# Patient Record
Sex: FEMALE | Race: WHITE | NOT HISPANIC OR LATINO | Employment: UNEMPLOYED | URBAN - METROPOLITAN AREA
[De-identification: names, ages, dates, MRNs, and addresses within clinical notes are randomized per-mention and may not be internally consistent; named-entity substitution may affect disease eponyms.]

---

## 2017-07-21 LAB
ABO GROUP BLD: NORMAL
BLD GP AB SCN SERPL QL: NORMAL
C TRACH DNA SPEC QL NAA+PROBE: NORMAL
HBV SURFACE AG SERPL QL IA: NORMAL
HCT VFR BLD AUTO: NORMAL %
HGB BLD-MCNC: NORMAL G/DL
HIV 1 0 2 IC ZHVIC: NORMAL
N GONORRHOEA DNA SPEC QL NAA+PROBE: NORMAL
PHYSICIAN READ PAP  881411: NORMAL
PLATELET # BLD AUTO: NORMAL 10*3/UL
RH BLD: NORMAL
RPR SER QL: NON REACTIVE
RUBV IGG SERPL IA-ACNC: NORMAL

## 2017-08-02 ENCOUNTER — APPOINTMENT (OUTPATIENT)
Dept: OBGYN | Facility: CLINIC | Age: 22
End: 2017-08-02
Payer: COMMERCIAL

## 2017-08-25 ENCOUNTER — HOSPITAL ENCOUNTER (OUTPATIENT)
Facility: MEDICAL CENTER | Age: 22
End: 2017-08-25
Attending: PHYSICIAN ASSISTANT
Payer: COMMERCIAL

## 2017-08-25 ENCOUNTER — INITIAL PRENATAL (OUTPATIENT)
Dept: OBGYN | Facility: CLINIC | Age: 22
End: 2017-08-25
Payer: COMMERCIAL

## 2017-08-25 VITALS
SYSTOLIC BLOOD PRESSURE: 100 MMHG | BODY MASS INDEX: 22.02 KG/M2 | DIASTOLIC BLOOD PRESSURE: 62 MMHG | HEIGHT: 66 IN | WEIGHT: 137 LBS

## 2017-08-25 DIAGNOSIS — Z34.01 ENCOUNTER FOR SUPERVISION OF NORMAL FIRST PREGNANCY IN FIRST TRIMESTER: ICD-10-CM

## 2017-08-25 DIAGNOSIS — Z34.01 SUPERVISION OF NORMAL FIRST PREGNANCY IN FIRST TRIMESTER: ICD-10-CM

## 2017-08-25 LAB
APPEARANCE UR: NORMAL
BILIRUB UR STRIP-MCNC: NORMAL MG/DL
COLOR UR AUTO: NORMAL
GLUCOSE UR STRIP.AUTO-MCNC: NORMAL MG/DL
KETONES UR STRIP.AUTO-MCNC: NORMAL MG/DL
LEUKOCYTE ESTERASE UR QL STRIP.AUTO: NORMAL
NITRITE UR QL STRIP.AUTO: NORMAL
PH UR STRIP.AUTO: 6.5 [PH] (ref 5–8)
PROT UR QL STRIP: NORMAL MG/DL
RBC UR QL AUTO: NORMAL
SP GR UR STRIP.AUTO: 1.01
UROBILINOGEN UR STRIP-MCNC: NORMAL MG/DL

## 2017-08-25 PROCEDURE — 81002 URINALYSIS NONAUTO W/O SCOPE: CPT | Performed by: PHYSICIAN ASSISTANT

## 2017-08-25 PROCEDURE — 59401 PR NEW OB VISIT: CPT | Performed by: PHYSICIAN ASSISTANT

## 2017-08-25 NOTE — Clinical Note
Cystic Fibrosis Carrier Testing  Cristin Desai    The following information is about a blood test that can be done to determine if you and/or your partner carry the gene for cystic fibrosis.    WHAT IS CYSTIC FIBROSIS?  · Cystic fibrosis (CF) is an inherited disease that affects more than 25,000 American children and young adults.  · Symptoms of CF vary but include lung congestion, pneumonia, diarrhea and poor growth.  Most people with CF have severe medical problems and some die at a young age.  Others have so few symptoms they are unaware they have CF.  · CF does not affect intelligence.  · Although there is no cure for CF at this time, scientists are making progress in improving treatment and in searching for a cure.  In the past many people with CF  at a very young age.  Today, many are living into their 20’s and 30’s.    IS THERE A CHANCE MY BABY COULD HAVE CYSTIC FIBROSIS?  · You can have a child with CF even if there is no history in your family (see chart below).  · CF testing can help determine if you are a carrier and at risk to have a child with CF.  Note: if both parents are carriers, there is a 1 in 4 (25%) chance with each pregnancy that they will have a child with CF.  · Carriers have one normal CF gene and one altered CF gene.  · People with CF have two altered CF genes.  · Most people have two normal copies of the CF gene.    Approximate risk that a couple with no family history of cystic fibrosis will have a child with cystic fibrosis:    Ethnic background / Risk     couple:  1 in 2,500   couple:  1 in 15,000            couple:  1 in 8,000     American couple:  1 in 32,000     WHAT TESTING IS AVAILABLE?  · There is a blood test that can be done to find out if you or your partner is a carrier.  · It is important to understand that CF carrier testing does not detect all CF carriers.  · If the test shows that you are both CF carriers, you unborn baby can be  tested to find out if the baby has CF.    HOW MUCH DOES IT COST TO HAVE CYSTIC FIBROSIS CARRIER TESTING?  · Cost and insurance coverage for CF carrier testing vary depending upon the laboratory used and your insurance policy.  · The average cost for CF carrier testing is $780 per person.  · Your genetic counselor can provide you with more information about cystic fibrosis carrier testing.    _____  Yes, I am interested in discussing carrier testing with a genetic counselor.    _____  No, I am not interested in CF carrier testing or in receiving more information about CF carrier testing.      Client signature: ________________________________________  8/25/2017

## 2017-08-25 NOTE — PROGRESS NOTES
Pt. Here for NOB visit today.  # [phone]  [facility], pt brought records with her.   Pt. States having some morning sickness that comes and goes.   Pharmacy verified.

## 2017-08-25 NOTE — MR AVS SNAPSHOT
"Cristin Desai   2017 8:30 AM   Initial Prenatal   MRN: [id number]    Department[facility]   Dept Phone:  [phone]    Description:  Female : 1995   Provider:  TA Jesus; PC INTAKE           Allergies as of 2017     No Known Allergies      You were diagnosed with     Encounter for supervision of normal first pregnancy in first trimester   [979202]       Supervision of normal first pregnancy in first trimester   [[id number]]         Vital Signs     Blood Pressure Height Weight Body Mass Index Last Menstrual Period Smoking Status    100/62 mmHg 1.676 m (5' 6\") 62.143 kg (137 lb) 22.12 kg/m2 2017 Never Smoker       Basic Information     Date Of Birth Sex Race Ethnicity Preferred Language    1995 Female White Non- English      Your appointments     Sep 22, 2017  8:00 AM   OB Follow Up with TA Jesus   [facility] ([facility])    [address]  [place] [id number] [phone]            Oct 20, 2017  8:00 AM   US PREG 60 with PREG CTR US 1   [facility] ([facility])    [facility]  [address]  [place] [id number] [phone]           For [facility] Pregnancy [place] patients only: 1. Please arrive 15 min prior to your appointment time. 2. If you're late, you will be rescheduled for the next available appointment. 3. If you need to reschedule your appointment, please call us at [phone] 48 hours prior to your appointment. 4. Do not bring children as they will not be allowed in exam room. 5. Only one family member may be present in room during exam. 6. The exam will be 30-60 minutes depending on the exam ordered by the physician. 7. The sonographer is not allowed to discuss findings during the exam. Your provider will go over the results with you at your next appointment. 8. The purpose of this ultrasound is to determine if baby is healthy. Diagnostic ultrasounds are NOT to " determine the gender of the baby. 9. NO photography or video recording is allowed in exam room. 10. NO cell phones allowed in the exam room. INFORMACION SOBRE ROBLEDO ULTRASONIDO 1. Por favor de llegar 15 minutos antes de robledo capo. 2. Si llega tarde, le tenemos que cambiar la capo para otra fecha. 3. Si necesita cambiar robledo capo, por favor llame 48 horas antes de la capo. [phone] 4. Por favor no traer niños. No se permiten en cuarto de Ultrasonido. 5. Solamente se permite bernice persona en el cuarto rosina el examen. 6. El examen dura 30-60 minutos, dependiendo del examen ordenado por el Doctor. 7. El Sonógrafo no está autorizado hablar sobre robledo examen. Robledo doctor o partera le va explicar los resultados en robledo próxima capo. 8. El propósito del Ultrasonido es para determinar si robledo randy viene saludable. No es para determinar el sexo de robledo randy. 9. Por favor no fotos o cámaras de grabar. 10. No celulares permitidos en el cuarto de examen.              Problem List              ICD-10-CM Priority Class Noted - Resolved    Supervision of normal first pregnancy in first trimester Z34.01   8/25/2017 - Present      Health Maintenance        Date Due Completion Dates    IMM HEP B VACCINE (1 of 3 - Primary Series) 1995 ---    IMM HEP A VACCINE (1 of 2 - Standard Series) 1/1/1997 ---    IMM HPV VACCINE (1 of 3 - Female 3 Dose Series) 1/1/2007 ---    IMM VARICELLA (CHICKENPOX) VACCINE (1 of 2 - 2 Dose Adolescent Series) 1/1/2009 ---    IMM MENINGOCOCCAL VACCINE (MCV4) (1 of 1) 1/1/2012 ---    IMM DTaP/Tdap/Td Vaccine (1 - Tdap) 1/1/2015 ---    PAP SMEAR 1/1/2017 ---    IMM INFLUENZA (1) 9/1/2017 ---            Results     POCT Urinalysis      Component Value Standard Range & Units    POC Color  Negative    POC Appearance  Negative    POC Leukocyte Esterase Trace Negative    POC Nitrites Neg Negative    POC Urobiligen  Negative (0.2) mg/dL    POC Protein Trace Negative mg/dL    POC Urine PH 6.5 5.0 - 8.0    POC Blood Neg Negative      POC Specific Gravity 1.015 <1.005 - >1.030    POC Ketones Neg Negative mg/dL    POC Biliruben  Negative mg/dL    POC Glucose Neg Negative mg/dL                        Current Immunizations     No immunizations on file.      Below and/or attached are the medications your provider expects you to take. Review all of your home medications and newly ordered medications with your provider and/or pharmacist. Follow medication instructions as directed by your provider and/or pharmacist. Please keep your medication list with you and share with your provider. Update the information when medications are discontinued, doses are changed, or new medications (including over-the-counter products) are added; and carry medication information at all times in the event of emergency situations     Allergies:  No Known Allergies          Medications  Valid as of: August 25, 2017 -  2:10 PM    Generic Name Brand Name Tablet Size Instructions for use    Drospirenone-Ethinyl Estradiol   Take  by mouth.        Drospirenone-Ethinyl Estradiol (Tab) BURT 3-0.02 MG Take 1 Tab by mouth every day.        Ibuprofen (Tab) MOTRIN 200 MG Take 400 mg by mouth every 6 hours as needed.        Naproxen (Tab) NAPROSYN 500 MG Take 1 Tab by mouth 2 times a day, with meals.        NON SPECIFIED   Tincture methiolate        NON SPECIFIED   sulfatiazol        Prenatal MV-Min-Fe Fum-FA-DHA   Take  by mouth.        .                 Medicines prescribed today were sent to:     [facility][id number] - [place], [place] - [address] [place] [address] AT [address] [address]    Phone: [phone] Fax: [phone]    Open 24 Hours?: No      Medication refill instructions:       If your prescription bottle indicates you have medication refills left, it is not necessary to call your provider’s office. Please contact your pharmacy and they will refill your medication.    If your prescription bottle indicates you do not have any refills left,  you may request refills at any time through one of the following ways: The online [employer] system (except Urgent Care), by calling your provider’s office, or by asking your pharmacy to contact your provider’s office with a refill request. Medication refills are processed only during regular business hours and may not be available until the next business day. Your provider may request additional information or to have a follow-up visit with you prior to refilling your medication.   *Please Note: Medication refills are assigned a new Rx number when refilled electronically. Your pharmacy may indicate that no refills were authorized even though a new prescription for the same medication is available at the pharmacy. Please request the medicine by name with the pharmacy before contacting your provider for a refill.        Your To Do List     Future Labs/Procedures Complete By Expires    CHLAMYDIA/GC PCR URINE OR SWAB  As directed 8/26/2018    US-OB 2ND 3RD TRI COMPLETE  As directed 2/25/2018      Other Notes About Your Plan     FOB: Leonard [place] Status: Patient Declined
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
## 2017-08-26 LAB
C TRACH DNA SPEC QL NAA+PROBE: NEGATIVE
N GONORRHOEA DNA SPEC QL NAA+PROBE: NEGATIVE
SPECIMEN SOURCE: NORMAL

## 2017-09-22 ENCOUNTER — ROUTINE PRENATAL (OUTPATIENT)
Dept: OBGYN | Facility: CLINIC | Age: 22
End: 2017-09-22
Payer: COMMERCIAL

## 2017-09-22 VITALS — SYSTOLIC BLOOD PRESSURE: 108 MMHG | DIASTOLIC BLOOD PRESSURE: 62 MMHG | WEIGHT: 140 LBS | BODY MASS INDEX: 22.6 KG/M2

## 2017-09-22 DIAGNOSIS — Z67.91 RH NEGATIVE STATUS DURING PREGNANCY IN SECOND TRIMESTER: ICD-10-CM

## 2017-09-22 DIAGNOSIS — O26.892 RH NEGATIVE STATUS DURING PREGNANCY IN SECOND TRIMESTER: ICD-10-CM

## 2017-09-22 DIAGNOSIS — Z34.01 SUPERVISION OF NORMAL FIRST PREGNANCY IN FIRST TRIMESTER: ICD-10-CM

## 2017-09-22 PROCEDURE — 90040 PR PRENATAL FOLLOW UP: CPT | Performed by: PHYSICIAN ASSISTANT

## 2017-09-22 NOTE — PROGRESS NOTES
Pt has no complaints with cramping, bleeding or pain. No FM yet. PNL, GC/CT wnl - pt notified of results. Declines AFP today, but will do if US abnormal. US on 10/20. Declines flu vaccine. RTC 4 wk or sooner prn.

## 2017-09-22 NOTE — PROGRESS NOTES
Ob f/u.   No VB, LOF or contractions   C/O morning sickness only  Phone number # [phone]  Pharmacy verified with patient  WT= 140 lbs             KR=842/62  Flu shot offered, pt declines   US schedule on 10/20/2017 8:00 am

## 2017-10-19 ENCOUNTER — ROUTINE PRENATAL (OUTPATIENT)
Dept: OBGYN | Facility: CLINIC | Age: 22
End: 2017-10-19
Payer: COMMERCIAL

## 2017-10-19 VITALS — WEIGHT: 143 LBS | SYSTOLIC BLOOD PRESSURE: 102 MMHG | DIASTOLIC BLOOD PRESSURE: 60 MMHG | BODY MASS INDEX: 23.08 KG/M2

## 2017-10-19 DIAGNOSIS — Z34.01 SUPERVISION OF NORMAL FIRST PREGNANCY IN FIRST TRIMESTER: Primary | ICD-10-CM

## 2017-10-19 DIAGNOSIS — Z67.91 RH NEGATIVE STATUS DURING PREGNANCY IN SECOND TRIMESTER: ICD-10-CM

## 2017-10-19 DIAGNOSIS — O26.892 RH NEGATIVE STATUS DURING PREGNANCY IN SECOND TRIMESTER: ICD-10-CM

## 2017-10-19 PROCEDURE — 90040 PR PRENATAL FOLLOW UP: CPT | Performed by: PHYSICIAN ASSISTANT

## 2017-10-19 PROCEDURE — 90471 IMMUNIZATION ADMIN: CPT | Performed by: PHYSICIAN ASSISTANT

## 2017-10-19 PROCEDURE — 90686 IIV4 VACC NO PRSV 0.5 ML IM: CPT | Performed by: PHYSICIAN ASSISTANT

## 2017-10-19 ASSESSMENT — PATIENT HEALTH QUESTIONNAIRE - PHQ9: CLINICAL INTERPRETATION OF PHQ2 SCORE: 0

## 2017-10-19 NOTE — PROGRESS NOTES
Ob f/u. + fetal movement   No VB, LOF or contractions   C/O   Cramping  Phone number # [phone]  Pharmacy verified with patient  WT= 143 lbs             RC=358/60  Pt declines AFP  Pt declines flu shot   US scheduled on 10/20/2017

## 2017-10-19 NOTE — PROGRESS NOTES
Pt has no complaints with cramping, bleeding or pain, though pt has had pain in L side under ribs where she thinks she pulled a muscle. Pt to try warm and cool packs prn, Tylenol prn, call if worsening. +FM.  US to be done tomorrow. Declines AFP. Flu vaccine given today. RTC 4 wk or sooner prn.

## 2017-10-20 ENCOUNTER — APPOINTMENT (OUTPATIENT)
Dept: RADIOLOGY | Facility: IMAGING CENTER | Age: 22
End: 2017-10-20
Attending: PHYSICIAN ASSISTANT
Payer: COMMERCIAL

## 2017-10-20 DIAGNOSIS — Z34.01 ENCOUNTER FOR SUPERVISION OF NORMAL FIRST PREGNANCY IN FIRST TRIMESTER: ICD-10-CM

## 2017-10-20 PROCEDURE — 76805 OB US >/= 14 WKS SNGL FETUS: CPT | Performed by: PHYSICIAN ASSISTANT

## 2017-10-23 ENCOUNTER — TELEPHONE (OUTPATIENT)
Dept: OBGYN | Facility: CLINIC | Age: 22
End: 2017-10-23

## 2017-10-23 NOTE — TELEPHONE ENCOUNTER
Pt called the triage line requesting U/S results. I called Pt back and informed her that her U/S was within normal limits and that her due date will stay the same. I informed Pt that the provider will review her U/S with more detail at her next OB appt. Pt verbalized understanding and had no further questions.

## 2017-11-08 ENCOUNTER — TELEPHONE (OUTPATIENT)
Dept: OBGYN | Facility: CLINIC | Age: 22
End: 2017-11-08

## 2017-11-09 NOTE — TELEPHONE ENCOUNTER
Returned pt's phone call. Spoke with pt. Pt stated for the past 3 days she has been feeling a numbing / tingling sensation under her right breast on her ribs when she leans forward that lasts for couple minutes and it goes away when she leans back. Denies pain, redness, or warm to the touch. Reports +FM. After consulting with  Anushka Peñaloza C.N.M. Informed pt that the sensations she is feeling it maybe due to her breasts are growing and it may be pinching a nerve. Instructed pt to keep an eye on her symptoms if worsens then to call us back to come for an evaluation. Pt verbalized understanding and will comply with instructions. Pt had no further questions or concerns.

## 2017-11-14 ENCOUNTER — HOSPITAL ENCOUNTER (OUTPATIENT)
Facility: MEDICAL CENTER | Age: 22
End: 2017-11-14
Attending: OBSTETRICS & GYNECOLOGY | Admitting: OBSTETRICS & GYNECOLOGY
Payer: COMMERCIAL

## 2017-11-14 VITALS
RESPIRATION RATE: 16 BRPM | HEART RATE: 81 BPM | DIASTOLIC BLOOD PRESSURE: 75 MMHG | SYSTOLIC BLOOD PRESSURE: 121 MMHG | TEMPERATURE: 98.1 F

## 2017-11-14 LAB — CRYSTALS AMN MICRO: NORMAL

## 2017-11-14 PROCEDURE — 89060 EXAM SYNOVIAL FLUID CRYSTALS: CPT

## 2017-11-14 NOTE — PROGRESS NOTES
0840-pt presents from home with c/o wet underwear and feeling like she is leaking fluid, no c/o bleeding, pain or uc's, states baby is moving normally, FHR dopplered at 150's, TOCO applied, vs taken  0850-report given to Dr Lema, will discuss with Dr Tello which test to use,  0900-TC Dr Lema, della slide ordered  0905-SSE performed, no pooling noted, white discharge noted, fern slide prepared and sent  1020-ferstephany back negative, TC Dr Lema  1025-discharge orders received  1030-pt discharged home with understanding PTL, verbalized understanding, left ambulatory with SO

## 2017-11-14 NOTE — PROGRESS NOTES
[facility] LABOR AND DELIVERY TRIAGE PROGRESS NOTE    PATIENT ID:  NAME:  Cristin Desai  MRN:               [id number]  YOB: 1995     22 y.o. female  at 23w6d.    Subjective: Pt comes in complaining leakage of fluid. Pt states her underwear have been wet for the past 2 days. Pt admits to sexual intercourse 3 days ago. Pt denies any spotting or cramping.       negative  For CTXS.   negative Feels pain   unknown for LOF  negative for vaginal bleeding.   positive for fetal movement    ROS: Patient denies any fever chills, nausea, vomiting, headache, chest pain, shortness of breath, or dysuria or unusual swelling of hands or feet.     Objective:    There were no vitals filed for this visit.  No data recorded.    General: No acute distress, resting comfortably in bed.  HEENT: normocephalic, nontraumatic, PERRLA, EOMI  Cardiovascular: Heart RRR with no murmurs, rubs or gallops. Distal Pulses 2+  Respiratory: symmetric chest expansion, lungs CTAB, with no wheezes, rales, rhonci  Abdomen: gravid, nontender  Musculoskeletal: strength 5/5 in four extremities  Neuro: non focal with no numbness, tingling or changes in sensation    Cervix: Closed thick and high  [place]: No Uterine Contractions   Doppler:  bpm     Assessment: 22 y.o. female  at 23w6d.    Plan:     1. Fern Negative    2. Discharge home with return precautions and instructions to if increased intensity or frequency of contractions, decreased fetal movement, vaginal bleeding or leakage of fluid.       Discussed case with Dr. Mclean, TPN Attending. Case was discussed and attending agreed with plan prior to discharge of patient.

## 2017-11-22 ENCOUNTER — ROUTINE PRENATAL (OUTPATIENT)
Dept: OBGYN | Facility: CLINIC | Age: 22
End: 2017-11-22
Payer: COMMERCIAL

## 2017-11-22 VITALS — SYSTOLIC BLOOD PRESSURE: 118 MMHG | DIASTOLIC BLOOD PRESSURE: 70 MMHG | BODY MASS INDEX: 24.69 KG/M2 | WEIGHT: 153 LBS

## 2017-11-22 DIAGNOSIS — Z34.01 SUPERVISION OF NORMAL FIRST PREGNANCY IN FIRST TRIMESTER: Primary | ICD-10-CM

## 2017-11-22 DIAGNOSIS — Z67.91 RH NEGATIVE STATUS DURING PREGNANCY IN SECOND TRIMESTER: ICD-10-CM

## 2017-11-22 DIAGNOSIS — O26.892 RH NEGATIVE STATUS DURING PREGNANCY IN SECOND TRIMESTER: ICD-10-CM

## 2017-11-22 PROCEDURE — 90040 PR PRENATAL FOLLOW UP: CPT | Performed by: PHYSICIAN ASSISTANT

## 2017-11-22 NOTE — PROGRESS NOTES
Pt here for OB F/V. Good fetal movement. Denies LOF, VB.  US done 10/20/17- wants to review results  Phone number # [phone]  Pharmacy verified  Declined flu shot  28 week labs pended

## 2017-11-22 NOTE — PROGRESS NOTES
Pt has no complaints with cramping, bleeding or pain. +FM. 1hr GTT, H/H, RPR slip given today with instructions. US results wnl - pt notified. Pt gained 10 lb since last visit, diet reviewed and pt to cut back on sweets. Flu vaccines given last visit. Rhogam next visit, and TDaP given today. RTC 4 wk or sooner prn.

## 2017-12-04 ENCOUNTER — HOSPITAL ENCOUNTER (OUTPATIENT)
Dept: LAB | Facility: MEDICAL CENTER | Age: 22
End: 2017-12-04
Attending: PHYSICIAN ASSISTANT
Payer: COMMERCIAL

## 2017-12-04 DIAGNOSIS — Z34.01 SUPERVISION OF NORMAL FIRST PREGNANCY IN FIRST TRIMESTER: ICD-10-CM

## 2017-12-04 LAB
GLUCOSE 1H P 50 G GLC PO SERPL-MCNC: 90 MG/DL (ref 70–139)
HCT VFR BLD AUTO: 35 % (ref 37–47)
HGB BLD-MCNC: 11.1 G/DL (ref 12–16)
TREPONEMA PALLIDUM IGG+IGM AB [PRESENCE] IN SERUM OR PLASMA BY IMMUNOASSAY: NON REACTIVE

## 2017-12-04 PROCEDURE — 85014 HEMATOCRIT: CPT

## 2017-12-04 PROCEDURE — 85018 HEMOGLOBIN: CPT

## 2017-12-04 PROCEDURE — 82950 GLUCOSE TEST: CPT

## 2017-12-04 PROCEDURE — 86780 TREPONEMA PALLIDUM: CPT

## 2017-12-04 PROCEDURE — 36415 COLL VENOUS BLD VENIPUNCTURE: CPT

## 2017-12-07 ENCOUNTER — TELEPHONE (OUTPATIENT)
Dept: OBGYN | Facility: CLINIC | Age: 22
End: 2017-12-07

## 2017-12-07 NOTE — TELEPHONE ENCOUNTER
Pt called and asked when she can get the RHOGAM injections since she is O NEGATIVE blood type. And is concerned about having the RHOGAM  In her next visit because she will be 30 weeks     Jennifer LARIOS  was informed and recommend to scheduled a nurse visit to have the RHOGAM . And states that is not going to affect the baby if pt do not get the RHOGAM at 27 since pt was concerned about it.    Pt was informed and verbalized understanding   And states she scheduled an appt for the 13th

## 2017-12-13 ENCOUNTER — NON-PROVIDER VISIT (OUTPATIENT)
Dept: OBGYN | Facility: CLINIC | Age: 22
End: 2017-12-13
Payer: COMMERCIAL

## 2017-12-13 DIAGNOSIS — Z34.01 SUPERVISION OF NORMAL FIRST PREGNANCY IN FIRST TRIMESTER: ICD-10-CM

## 2017-12-13 DIAGNOSIS — Z67.91 RH NEGATIVE STATUS DURING PREGNANCY IN SECOND TRIMESTER: ICD-10-CM

## 2017-12-13 DIAGNOSIS — O36.0990 RHESUS ISOIMMUNIZATION DURING PREGNANCY, ANTEPARTUM, SINGLE OR UNSPECIFIED FETUS: ICD-10-CM

## 2017-12-13 DIAGNOSIS — O26.892 RH NEGATIVE STATUS DURING PREGNANCY IN SECOND TRIMESTER: ICD-10-CM

## 2017-12-13 PROCEDURE — 96372 THER/PROPH/DIAG INJ SC/IM: CPT | Performed by: NURSE PRACTITIONER

## 2017-12-19 ENCOUNTER — TELEPHONE (OUTPATIENT)
Dept: OBGYN | Facility: CLINIC | Age: 22
End: 2017-12-19

## 2017-12-19 NOTE — TELEPHONE ENCOUNTER
Returned pt's phone call. Spoke with pt. Pt stated she has a really bad stuffy nose and congestion for the past 2 days. Stated she is concerned that every time she blows up her nose she gets a bloody nose. Pt stated she is currently taking mucinex as it was advised when she called this morning but is not helping. Per consult with Adilene Calloway C.N.M. Pt was informed that she is getting a bloody nose when she blows up her nose due to her nasal is dry and needs to use Ocean Nasal Spray to moisten, also Adilene recommended to open up her shower and let the hot water run and breath in the steam wich will help open up her nasal congestion. Pt verbalized understanding and will comply with instructions.  Pt had no further questions or concerns.

## 2017-12-19 NOTE — TELEPHONE ENCOUNTER
Pt called the triage line asking what she could take for her cold symptoms. I called Pt back she is c/o having a stuffy nose, Pt denies any fevers, body aches, N&V or diarrhea. I read the cold and cough remedies hand out to patient and informed her that if she develops and fevers, N&V, diarrhea or her symptoms get worse she needs to be seen at the hospital. Pt verbalized understanding and had no further questions.

## 2017-12-28 ENCOUNTER — ROUTINE PRENATAL (OUTPATIENT)
Dept: OBGYN | Facility: CLINIC | Age: 22
End: 2017-12-28
Payer: COMMERCIAL

## 2017-12-28 VITALS — DIASTOLIC BLOOD PRESSURE: 64 MMHG | BODY MASS INDEX: 26.31 KG/M2 | WEIGHT: 163 LBS | SYSTOLIC BLOOD PRESSURE: 102 MMHG

## 2017-12-28 DIAGNOSIS — Z34.01 SUPERVISION OF NORMAL FIRST PREGNANCY IN FIRST TRIMESTER: Primary | ICD-10-CM

## 2017-12-28 DIAGNOSIS — O26.892 RH NEGATIVE STATUS DURING PREGNANCY IN SECOND TRIMESTER: ICD-10-CM

## 2017-12-28 DIAGNOSIS — Z67.91 RH NEGATIVE STATUS DURING PREGNANCY IN SECOND TRIMESTER: ICD-10-CM

## 2017-12-28 PROCEDURE — 90715 TDAP VACCINE 7 YRS/> IM: CPT | Performed by: PHYSICIAN ASSISTANT

## 2017-12-28 PROCEDURE — 90040 PR PRENATAL FOLLOW UP: CPT | Performed by: PHYSICIAN ASSISTANT

## 2017-12-28 PROCEDURE — 90471 IMMUNIZATION ADMIN: CPT | Performed by: PHYSICIAN ASSISTANT

## 2017-12-28 NOTE — PROGRESS NOTES
Ob f/u. + fetal movement good  No VB, LOF or contractions   C/O no complaints today   Phone number #  [phone]  Pharmacy verified with patient  XF=470 lbs              EC=284/64  ELIZABETH given with instructions   Tdap vaccine given. 12/28/2017 Right  Deltoid. VIS given and screening check list reviewed with pt.

## 2017-12-28 NOTE — LETTER
"Count Your Baby's Movements  Another step to a healthy delivery    A Epic Dress Re Test             Dept: [phone]    How Many Weeks Pregnant? 30w1d    Date to Begin Counting: today              How to use this chart    One way for your physician to keep track of your baby's health is by knowing how often the baby moves (or \"kicks\") in your womb.  You can help your physician to do this by using this chart every day.    Every day, you should see how many hours it takes for your baby to move 10 times.  Start in the morning, as soon as you get up.    · First, write down the time your baby moves until you get to 10.  · Check off one box every time your baby moves until you get to 10.  · Write down the time you finished counting in the last column.  · Total how long it took to count up all 10 movements.  · Finally, fill in the box that shows how long this took.  After counting 10 movements, you no longer have to count any more that day.  The next morning, just start counting again as soon as you get up.    What should you call a \"movement\"?  It is hard to say, because it will feel different from one mother to another and from one pregnancy to the next.  The important thing is that you count the movements the same way throughout your pregnancy.  If you have more questions, you should ask your physician.    Count carefully every day!  SAMPLE:  Week 28    How many hours did it take to feel 10 movements?       Start  Time     1     2     3     4     5     6     7     8     9     10   Finish Time   Mon 8:20 ·  ·  ·  ·  ·  ·  ·  ·  ·  ·  11:40   Tue Wed Thu Fri               Sat               Sun                 IMPORTANT: You should contact your physician if it takes more than two hours for you to feel 10 movements.  Each morning, write down the time and start to count the movements of your baby.  Keep track by checking off one box every time you feel one movement.  When you have " "felt 10 \"kicks\", write down the time you finished counting in the last column.  Then fill in the   box (over the check vernon) for the number of hours it took.  Be sure to read the complete instructions on the previous page.            "

## 2017-12-28 NOTE — PROGRESS NOTES
Pt has no complaints with cramping, UCs, VB, LOF, though pt has had incr pressure. +FM - FKC sheet given today. Rhogam given at 28wk. 1hr GTT, H/H, RPR wnl - pt notified of results. PTL precautions reviewed. Tour info given. Pt also discussed that she doesn't want to BF and how to prevent issues - strongly encouraged pt to think about BF. TDaP given today. Flu vaccine given already. RTC 2 wk or sooner prn.

## 2018-01-05 ENCOUNTER — TELEPHONE (OUTPATIENT)
Dept: OBGYN | Facility: CLINIC | Age: 23
End: 2018-01-05

## 2018-01-05 NOTE — TELEPHONE ENCOUNTER
Pt called the triage line c/o having sharp lower abdominal pain. I called Pt back she stated that she had lower abdominal pain yesterday, but that they got better after she took a warm shower went to bed. Pt denies any complications this morning and is reporting good fetal movement. I informed Pt that if her pain returns and happens every five mins for an hour, has heavy vaginal bleeding , vaginal leaking or if she has decreased fetal movement she needs to go to L&D. Pt verbalized understanding and had no further questions..

## 2018-01-09 ENCOUNTER — ROUTINE PRENATAL (OUTPATIENT)
Dept: OBGYN | Facility: CLINIC | Age: 23
End: 2018-01-09
Payer: COMMERCIAL

## 2018-01-09 VITALS — BODY MASS INDEX: 26.63 KG/M2 | DIASTOLIC BLOOD PRESSURE: 64 MMHG | SYSTOLIC BLOOD PRESSURE: 100 MMHG | WEIGHT: 165 LBS

## 2018-01-09 DIAGNOSIS — Z34.01 SUPERVISION OF NORMAL FIRST PREGNANCY IN FIRST TRIMESTER: ICD-10-CM

## 2018-01-09 DIAGNOSIS — Z67.91 RH NEGATIVE STATUS DURING PREGNANCY IN SECOND TRIMESTER: ICD-10-CM

## 2018-01-09 DIAGNOSIS — O26.892 RH NEGATIVE STATUS DURING PREGNANCY IN SECOND TRIMESTER: ICD-10-CM

## 2018-01-09 PROCEDURE — 90040 PR PRENATAL FOLLOW UP: CPT | Performed by: PHYSICIAN ASSISTANT

## 2018-01-09 ASSESSMENT — PATIENT HEALTH QUESTIONNAIRE - PHQ9: CLINICAL INTERPRETATION OF PHQ2 SCORE: 0

## 2018-01-09 NOTE — PROGRESS NOTES
Pt has no complaints with cramping, UCs, VB, LOF, though pt called last week after having lower abd pains that resolved with rest and water. PTL precautions reviewed. +FM. Tour/class info given today. RTC 2 wk or sooner prn.

## 2018-01-09 NOTE — PROGRESS NOTES
Ob f/u. + fetal movement good  No VB, LOF or contractions   C/O  Friday pt called states was having sharp pain in the pelvic are. She was told that maybe was the jocelyn pleitez. Pt states feeling good today No complaints   Phone number # [phone]  Pharmacy verified with patient  WT= 165 lbs             JS=441/64

## 2018-01-24 ENCOUNTER — ROUTINE PRENATAL (OUTPATIENT)
Dept: OBGYN | Facility: CLINIC | Age: 23
End: 2018-01-24
Payer: COMMERCIAL

## 2018-01-24 VITALS — DIASTOLIC BLOOD PRESSURE: 62 MMHG | SYSTOLIC BLOOD PRESSURE: 100 MMHG | WEIGHT: 173 LBS | BODY MASS INDEX: 27.92 KG/M2

## 2018-01-24 DIAGNOSIS — Z67.91 RH NEGATIVE STATUS DURING PREGNANCY IN SECOND TRIMESTER: ICD-10-CM

## 2018-01-24 DIAGNOSIS — Z34.01 SUPERVISION OF NORMAL FIRST PREGNANCY IN FIRST TRIMESTER: ICD-10-CM

## 2018-01-24 DIAGNOSIS — O26.892 RH NEGATIVE STATUS DURING PREGNANCY IN SECOND TRIMESTER: ICD-10-CM

## 2018-01-24 PROCEDURE — 90040 PR PRENATAL FOLLOW UP: CPT | Performed by: PHYSICIAN ASSISTANT

## 2018-01-24 NOTE — PROGRESS NOTES
Pt has no complaints with regular or strong UCs, Vb, LOF, though pt has occ tightening of abd only. +FM. GBS next visit. PTL precautions reviewed. Daily FKC recommended. Vtx confirmed by US today. RTC 2 wk or sooner prn.

## 2018-01-24 NOTE — PROGRESS NOTES
Pt. Here for OB/FU today. Reports Good FM.   Good # [phone]  Pt states having contractions that come and go.   Pharmacy verified.

## 2018-01-30 ENCOUNTER — TELEPHONE (OUTPATIENT)
Dept: OBGYN | Facility: CLINIC | Age: 23
End: 2018-01-30

## 2018-01-30 ENCOUNTER — HOSPITAL ENCOUNTER (OUTPATIENT)
Facility: MEDICAL CENTER | Age: 23
End: 2018-01-30
Attending: OBSTETRICS & GYNECOLOGY | Admitting: OBSTETRICS & GYNECOLOGY
Payer: COMMERCIAL

## 2018-01-30 VITALS
HEART RATE: 86 BPM | RESPIRATION RATE: 16 BRPM | DIASTOLIC BLOOD PRESSURE: 66 MMHG | WEIGHT: 173 LBS | TEMPERATURE: 97.5 F | HEIGHT: 66 IN | SYSTOLIC BLOOD PRESSURE: 113 MMHG | BODY MASS INDEX: 27.8 KG/M2

## 2018-01-30 LAB
APPEARANCE UR: CLEAR
COLOR UR AUTO: YELLOW
GLUCOSE UR QL STRIP.AUTO: NEGATIVE MG/DL
KETONES UR QL STRIP.AUTO: NEGATIVE MG/DL
LEUKOCYTE ESTERASE UR QL STRIP.AUTO: NEGATIVE
NITRITE UR QL STRIP.AUTO: NEGATIVE
PH UR STRIP.AUTO: 7 [PH]
PROT UR QL STRIP: NEGATIVE MG/DL
RBC UR QL AUTO: NEGATIVE
SP GR UR: 1.01

## 2018-01-30 PROCEDURE — 81002 URINALYSIS NONAUTO W/O SCOPE: CPT

## 2018-01-30 PROCEDURE — 59025 FETAL NON-STRESS TEST: CPT | Performed by: OBSTETRICS & GYNECOLOGY

## 2018-01-30 ASSESSMENT — PAIN SCALES - GENERAL: PAINLEVEL_OUTOF10: 0

## 2018-01-30 NOTE — TELEPHONE ENCOUNTER
Returning patient's call  Patient c/o hands and feet swollen and painful, having pressure headaches. Tylenol did not help. spots in front of her eyes.. Denies any lower abdominal pain, no vaginal bleeding. Baby moving okay. Drinking water.  States pain under her ribs but thinks is baby kicking. Consulted with Adilene Calloway CNM, she advised patient to be evaluated at L&D. Patient notified and agrees.

## 2018-01-30 NOTE — PROGRESS NOTES
EDC 3--18 34.6 weeks pt is here with c/O swelling in her hands and feet, H/A  And seeing spots. External monitors on serial BP are being taken, UA POC done with absent for protein. BP are all normal. DTRs are +2 and no colonus. Denies epigastric pain   And feels fetal movements. Reactive strip, Dr Santamaria was called report given and order received to discharge pt home.  1115 Dr Santamaria was asked to review heart tones due to some variables.  1120 Dr Santamaria reviewed the strip and called back,he is  Ok with the variables.  1130 Dr Santamaria was called again re strip and order received to BPP.  1220 Dr Santamaria called and BPP was discontinued due to reactive strip and order received to discharge pt home.  1229 pt was given instructions and was reassured, precautions given and pt was discharged home with FOB at 1232.

## 2018-02-09 ENCOUNTER — HOSPITAL ENCOUNTER (OUTPATIENT)
Facility: MEDICAL CENTER | Age: 23
End: 2018-02-09
Attending: PHYSICIAN ASSISTANT
Payer: COMMERCIAL

## 2018-02-09 ENCOUNTER — ROUTINE PRENATAL (OUTPATIENT)
Dept: OBGYN | Facility: CLINIC | Age: 23
End: 2018-02-09
Payer: COMMERCIAL

## 2018-02-09 VITALS — DIASTOLIC BLOOD PRESSURE: 72 MMHG | SYSTOLIC BLOOD PRESSURE: 122 MMHG | WEIGHT: 179 LBS | BODY MASS INDEX: 28.89 KG/M2

## 2018-02-09 DIAGNOSIS — Z67.91 RH NEGATIVE STATUS DURING PREGNANCY IN SECOND TRIMESTER: ICD-10-CM

## 2018-02-09 DIAGNOSIS — O26.892 RH NEGATIVE STATUS DURING PREGNANCY IN SECOND TRIMESTER: ICD-10-CM

## 2018-02-09 DIAGNOSIS — Z34.01 SUPERVISION OF NORMAL FIRST PREGNANCY IN FIRST TRIMESTER: Primary | ICD-10-CM

## 2018-02-09 DIAGNOSIS — Z34.01 SUPERVISION OF NORMAL FIRST PREGNANCY IN FIRST TRIMESTER: ICD-10-CM

## 2018-02-09 PROCEDURE — 90040 PR PRENATAL FOLLOW UP: CPT | Performed by: PHYSICIAN ASSISTANT

## 2018-02-09 PROCEDURE — 87653 STREP B DNA AMP PROBE: CPT

## 2018-02-09 NOTE — PROGRESS NOTES
Pt. here for Ob f/u and GBS today. Good # [phone]  Good FM  Pt states having some contractions that come and go.   Pharmacy verified.

## 2018-02-09 NOTE — PROGRESS NOTES
Pt has no complaints with cramping, UCs, Vb, LOF, though pt has had a lot of pressure over past week. Pt was seen in L&D for swelling of hands and feet, though pt notes she was travelling to  the day before. Pt has not had swelling since. +FM. GBS done today. Labor precautions reviewed in detail. Daily FKC and walks recommended. RTC 1 wk or sooner prn.

## 2018-02-11 LAB — GP B STREP DNA SPEC QL NAA+PROBE: NEGATIVE

## 2018-02-16 ENCOUNTER — ROUTINE PRENATAL (OUTPATIENT)
Dept: OBGYN | Facility: CLINIC | Age: 23
End: 2018-02-16
Payer: COMMERCIAL

## 2018-02-16 VITALS — SYSTOLIC BLOOD PRESSURE: 118 MMHG | BODY MASS INDEX: 29.54 KG/M2 | DIASTOLIC BLOOD PRESSURE: 78 MMHG | WEIGHT: 183 LBS

## 2018-02-16 DIAGNOSIS — Z34.01 SUPERVISION OF NORMAL FIRST PREGNANCY IN FIRST TRIMESTER: ICD-10-CM

## 2018-02-16 DIAGNOSIS — O26.892 RH NEGATIVE STATUS DURING PREGNANCY IN SECOND TRIMESTER: ICD-10-CM

## 2018-02-16 DIAGNOSIS — Z67.91 RH NEGATIVE STATUS DURING PREGNANCY IN SECOND TRIMESTER: ICD-10-CM

## 2018-02-16 PROCEDURE — 90040 PR PRENATAL FOLLOW UP: CPT | Performed by: PHYSICIAN ASSISTANT

## 2018-02-16 NOTE — PROGRESS NOTES
Pt. Here for OB/FU today. Reports Good FM.   Good # [phone]  Pt states having contractions that are getting more consistent. Also having low back pain.   Pharmacy verified.

## 2018-02-16 NOTE — PROGRESS NOTES
"Patient 37w2d reports \"jocelyn pleitez\"  starting in her lower back and coming to the side of her abdomen.  They have lasted for 45 minutes, 45 seconds the longest. Relieved with resting. She has experienced pain in her abdomen at night time when laying on her side, abdomen tightens.  Discussed UCs and reviewed and how to identify when to come to the hospital.    Her feet are swollen but not her hands. She is maintaining her fluid intake of 4 L per day.    + FM, No Vb, LOF.  GBS negative  FH 38    Vertex by internal exam 1/50%/-2, post, med  IOL discussed and will further discuss next week.  Comfortable with how to get to L&D    Note written by Jimbo Dockery RN, FNP student    History and exam done by Jimbo, but overseen by myself. Agree with above and the plan. MARSHALL Peterson PA-C.       "

## 2018-02-20 ENCOUNTER — HOSPITAL ENCOUNTER (OUTPATIENT)
Facility: MEDICAL CENTER | Age: 23
End: 2018-02-21
Attending: OBSTETRICS & GYNECOLOGY | Admitting: OBSTETRICS & GYNECOLOGY
Payer: COMMERCIAL

## 2018-02-20 LAB — CRYSTALS AMN MICRO: NORMAL

## 2018-02-20 PROCEDURE — 89060 EXAM SYNOVIAL FLUID CRYSTALS: CPT

## 2018-02-20 PROCEDURE — 59025 FETAL NON-STRESS TEST: CPT | Performed by: NURSE PRACTITIONER

## 2018-02-21 VITALS — DIASTOLIC BLOOD PRESSURE: 76 MMHG | TEMPERATURE: 98.6 F | SYSTOLIC BLOOD PRESSURE: 121 MMHG | HEART RATE: 84 BPM

## 2018-02-21 PROCEDURE — 59025 FETAL NON-STRESS TEST: CPT | Performed by: NURSE PRACTITIONER

## 2018-02-21 NOTE — PROGRESS NOTES
- pt arrived to unit with c/o loss of mucous plug and leaking of clear fluid. Pt of [facility]PC, , EDC 3/7/18, GA 37.6. Pt to LDA 3, instructed to leave UA sample and change.   - EFM/TOCO in place, VSS. Pt states that she was taking a bath earlier this evening. Got out to void and noted her mucous plug in the toilet. After some time she noticed clear fluid leaking out.    - SSE, no pooling noted. Fern collected, cervix appears to be closed. SVE closed/thick/floating.   - notifed JOE DEL REAL, reviewed tracing.   233- Dr. Arboleda and JOE DEL REAL in department. Reviewed entire tracing with RN. Order to d/c pt home.   2340- d/c education reviewed with pt and SO. Questions answered. Verbalized understanding.   2350- pt left in care of SO with all her personal possessions.

## 2018-02-23 ENCOUNTER — ROUTINE PRENATAL (OUTPATIENT)
Dept: OBGYN | Facility: CLINIC | Age: 23
End: 2018-02-23
Payer: COMMERCIAL

## 2018-02-23 VITALS — BODY MASS INDEX: 29.86 KG/M2 | DIASTOLIC BLOOD PRESSURE: 80 MMHG | SYSTOLIC BLOOD PRESSURE: 118 MMHG | WEIGHT: 185 LBS

## 2018-02-23 DIAGNOSIS — Z67.91 RH NEGATIVE STATUS DURING PREGNANCY IN SECOND TRIMESTER: ICD-10-CM

## 2018-02-23 DIAGNOSIS — O26.892 RH NEGATIVE STATUS DURING PREGNANCY IN SECOND TRIMESTER: ICD-10-CM

## 2018-02-23 DIAGNOSIS — Z34.01 SUPERVISION OF NORMAL FIRST PREGNANCY IN FIRST TRIMESTER: ICD-10-CM

## 2018-02-23 PROCEDURE — 90040 PR PRENATAL FOLLOW UP: CPT | Performed by: PHYSICIAN ASSISTANT

## 2018-02-23 NOTE — PROGRESS NOTES
Pt here today for OB follow up  Reports +FM  WT: 185 lb  BP: 118/80  Pt states no complaints today  Good # [phone]

## 2018-02-23 NOTE — PROGRESS NOTES
Pt has no complaints with cramping, strong UCs, Vb, LOF, though pt was seen in L&D after she lost her mucous plug. +FM. Cervix: 1/50/floating but vtx, very soft, mid. Labor precautions reviewed. Daily FKC and walks recommended. Will schedule IOL for 41wk if not in labor by next visit. RTC 1 wk or sooner prn.

## 2018-03-01 ENCOUNTER — ROUTINE PRENATAL (OUTPATIENT)
Dept: OBGYN | Facility: CLINIC | Age: 23
End: 2018-03-01
Payer: COMMERCIAL

## 2018-03-01 VITALS — SYSTOLIC BLOOD PRESSURE: 118 MMHG | DIASTOLIC BLOOD PRESSURE: 70 MMHG | WEIGHT: 187 LBS | BODY MASS INDEX: 30.18 KG/M2

## 2018-03-01 DIAGNOSIS — Z67.91 RH NEGATIVE STATUS DURING PREGNANCY IN SECOND TRIMESTER: ICD-10-CM

## 2018-03-01 DIAGNOSIS — O26.892 RH NEGATIVE STATUS DURING PREGNANCY IN SECOND TRIMESTER: ICD-10-CM

## 2018-03-01 DIAGNOSIS — Z34.01 SUPERVISION OF NORMAL FIRST PREGNANCY IN FIRST TRIMESTER: ICD-10-CM

## 2018-03-01 PROCEDURE — 90040 PR PRENATAL FOLLOW UP: CPT | Performed by: NURSE PRACTITIONER

## 2018-03-01 ASSESSMENT — PATIENT HEALTH QUESTIONNAIRE - PHQ9: CLINICAL INTERPRETATION OF PHQ2 SCORE: 0

## 2018-03-01 NOTE — PROGRESS NOTES
SUBJECTIVE:  Pt is a 23 y.o.   at 39w1d  gestation. Presents today for follow-up prenatal care. Reports no issues at this time.  Reports good  fetal movement. Denies cramping/contractions, bleeding or leaking of fluid. Denies dysuria, headaches, N/V, or other issues at this time. Generally feels well today.     OBJECTIVE:  - See prenatal vitals flow  -   Vitals:    18 0835   BP: 118/70   Weight: 84.8 kg (187 lb)      Labs - normal prenatal panel, normal glucose. Rh neg. GBS neg  US - normal fetal survey            ASSESSMENT:   - IUP at 39w1d    - S=D   -   Patient Active Problem List    Diagnosis Date Noted   • Rh negative status during pregnancy - Rhogam given 17   • Supervision of normal first pregnancy in first trimester 2017         PLAN:  - S/sx pregnancy and labor warning signs vs general discomforts discussed  - Fetal movements and kick counts reviewed   - Adequate hydration reinforced  - Nutrition/exercise/vitamin education; continued PNV  - Encouraged tour of LnD/childbirth education classes: contact info provided   - Order place for induction to be placed in absence of spontaneous labor.

## 2018-03-01 NOTE — PROGRESS NOTES
Ob f/u. + fetal movement good  No VB, LOF or contractions   C/O contractions   Phone number # [phone]  Pharmacy verified with patient  HP=606 lbs              HG=603/70

## 2018-03-05 ENCOUNTER — TELEPHONE (OUTPATIENT)
Dept: OBGYN | Facility: CLINIC | Age: 23
End: 2018-03-05

## 2018-03-05 ENCOUNTER — TELEPHONE (OUTPATIENT)
Dept: OBGYN | Facility: MEDICAL CENTER | Age: 23
End: 2018-03-05

## 2018-03-05 NOTE — TELEPHONE ENCOUNTER
Returned pt's phone call. No answer. Left VM for pt to call back. (pt left  in with [facility] this morning having some questions)

## 2018-03-06 NOTE — TELEPHONE ENCOUNTER
Returned Pt's phone call. Pt stated she has been feeling contractions 10-15 minutes apart lasting for about 3 minutes. Reported +FM. Denies vaginal bleeding or LOF. Pt stated she saw [facility] last week and was told that some one will call her with an induction date. I consulted with Adilene Calloway C.N.M. And I was instructed to inform pt the following: if her contractions are 3-5 minutes apart, water brakes, vaginal bleeding like a period or baby not moving or not meeting the fetal movements to go to L&D for evaluation. Pt informed. Per Adilene when pt comes for her next appt the provider will put her referral for her induction. Pt verbalized understanding and will comply with instructions. HERVE jane no further questions.

## 2018-03-07 ENCOUNTER — TELEPHONE (OUTPATIENT)
Dept: OBGYN | Facility: CLINIC | Age: 23
End: 2018-03-07

## 2018-03-07 NOTE — TELEPHONE ENCOUNTER
Pt called triage line regarding her IOL on 3/9. Confirmed with pt that her appt is at 9:00 Am, let pt know that at her visit tomorrow she will get the handout with instructions. Pt had no other questions.

## 2018-03-08 ENCOUNTER — ROUTINE PRENATAL (OUTPATIENT)
Dept: OBGYN | Facility: CLINIC | Age: 23
End: 2018-03-08
Payer: COMMERCIAL

## 2018-03-08 VITALS — WEIGHT: 191 LBS | BODY MASS INDEX: 30.83 KG/M2 | SYSTOLIC BLOOD PRESSURE: 108 MMHG | DIASTOLIC BLOOD PRESSURE: 66 MMHG

## 2018-03-08 DIAGNOSIS — Z34.01 SUPERVISION OF NORMAL FIRST PREGNANCY IN FIRST TRIMESTER: ICD-10-CM

## 2018-03-08 DIAGNOSIS — O26.892 RH NEGATIVE STATUS DURING PREGNANCY IN SECOND TRIMESTER: ICD-10-CM

## 2018-03-08 DIAGNOSIS — Z67.91 RH NEGATIVE STATUS DURING PREGNANCY IN SECOND TRIMESTER: ICD-10-CM

## 2018-03-08 PROCEDURE — 90040 PR PRENATAL FOLLOW UP: CPT | Performed by: NURSE PRACTITIONER

## 2018-03-08 NOTE — PROGRESS NOTES
S) Pt is a 23 y.o.   at 40w1d  gestation. Routine prenatal care today. No complaints today. Has IOL scheduled for tomorrow morning. Discussed what to do before IOL and calling L&D before going in. Labor precautions discussed, all questions answered.    Fetal movement Normal  Cramping no  VB no  LOF no   Denies dysuria. Generally feels well today. Good self-care activities identified. Denies headaches, swelling, visual changes, or epigastric pain .     O) Blood pressure 108/66, weight 86.6 kg (191 lb), last menstrual period 2017.        Labs:       PNL: WNL       GCT: 90       AFP: Not Examined       GBS: negative       Pertinent ultrasound -        10/20/17- Survey WNL, SHARRON 13.16cm, c/w prev dating.    A) IUP at 40w1d       S=D         Patient Active Problem List    Diagnosis Date Noted   • Rh negative status during pregnancy - Rhogam given 17   • Supervision of normal first pregnancy in first trimester 2017          SVE: 2/50/-3         TDAP: yes       FLU: yes        BTL: no       : n/a       C/S Consent: n/a       IOL or C/S scheduled: yes - 3/9/18 at 0900       LAST PAP: 17- Negative         P) s/s ptl vs general discomforts. Fetal movements reviewed. General ed and anticipatory guidance. Nutrition/exercise/vitamin. Plans breast Plans pp contraception- unsure  Continue PNV.

## 2018-03-08 NOTE — PROGRESS NOTES
Ob f/u. + fetal movement good  No VB, LOF or contractions   C/O contractions 2-3 min apart last 25 seconds   Phone number # [phone]  Pharmacy verified with patient  WT= 191 lbs             CR=599/66  Iol Fri March 9 at 9:00am.  Pt was informed today, by Soraya Melton. instructions given

## 2018-03-09 ENCOUNTER — HOSPITAL ENCOUNTER (INPATIENT)
Facility: MEDICAL CENTER | Age: 23
LOS: 2 days | End: 2018-03-11
Attending: OBSTETRICS & GYNECOLOGY | Admitting: OBSTETRICS & GYNECOLOGY
Payer: COMMERCIAL

## 2018-03-09 LAB
BASOPHILS # BLD AUTO: 0.3 % (ref 0–1.8)
BASOPHILS # BLD: 0.05 K/UL (ref 0–0.12)
EOSINOPHIL # BLD AUTO: 0.08 K/UL (ref 0–0.51)
EOSINOPHIL NFR BLD: 0.6 % (ref 0–6.9)
ERYTHROCYTE [DISTWIDTH] IN BLOOD BY AUTOMATED COUNT: 45.6 FL (ref 35.9–50)
HCT VFR BLD AUTO: 32.3 % (ref 37–47)
HGB BLD-MCNC: 10.2 G/DL (ref 12–16)
HOLDING TUBE BB 8507: NORMAL
IMM GRANULOCYTES # BLD AUTO: 0.2 K/UL (ref 0–0.11)
IMM GRANULOCYTES NFR BLD AUTO: 1.4 % (ref 0–0.9)
LYMPHOCYTES # BLD AUTO: 2.82 K/UL (ref 1–4.8)
LYMPHOCYTES NFR BLD: 19.7 % (ref 22–41)
MCH RBC QN AUTO: 26.2 PG (ref 27–33)
MCHC RBC AUTO-ENTMCNC: 31.6 G/DL (ref 33.6–35)
MCV RBC AUTO: 83 FL (ref 81.4–97.8)
MONOCYTES # BLD AUTO: 0.99 K/UL (ref 0–0.85)
MONOCYTES NFR BLD AUTO: 6.9 % (ref 0–13.4)
NEUTROPHILS # BLD AUTO: 10.19 K/UL (ref 2–7.15)
NEUTROPHILS NFR BLD: 71.1 % (ref 44–72)
NRBC # BLD AUTO: 0 K/UL
NRBC BLD-RTO: 0 /100 WBC
PLATELET # BLD AUTO: 283 K/UL (ref 164–446)
PMV BLD AUTO: 12.1 FL (ref 9–12.9)
RBC # BLD AUTO: 3.89 M/UL (ref 4.2–5.4)
WBC # BLD AUTO: 14.3 K/UL (ref 4.8–10.8)

## 2018-03-09 PROCEDURE — 85025 COMPLETE CBC W/AUTO DIFF WBC: CPT

## 2018-03-09 PROCEDURE — 700101 HCHG RX REV CODE 250: Performed by: STUDENT IN AN ORGANIZED HEALTH CARE EDUCATION/TRAINING PROGRAM

## 2018-03-09 PROCEDURE — 770002 HCHG ROOM/CARE - OB PRIVATE (112)

## 2018-03-09 PROCEDURE — 700105 HCHG RX REV CODE 258

## 2018-03-09 PROCEDURE — 700111 HCHG RX REV CODE 636 W/ 250 OVERRIDE (IP): Performed by: NURSE PRACTITIONER

## 2018-03-09 PROCEDURE — 700111 HCHG RX REV CODE 636 W/ 250 OVERRIDE (IP)

## 2018-03-09 PROCEDURE — 700111 HCHG RX REV CODE 636 W/ 250 OVERRIDE (IP): Performed by: OBSTETRICS & GYNECOLOGY

## 2018-03-09 RX ORDER — OXYCODONE HYDROCHLORIDE AND ACETAMINOPHEN 5; 325 MG/1; MG/1
1 TABLET ORAL EVERY 4 HOURS PRN
Status: DISCONTINUED | OUTPATIENT
Start: 2018-03-09 | End: 2018-03-11 | Stop reason: HOSPADM

## 2018-03-09 RX ORDER — ACETAMINOPHEN 325 MG/1
325 TABLET ORAL EVERY 4 HOURS PRN
Status: DISCONTINUED | OUTPATIENT
Start: 2018-03-09 | End: 2018-03-11 | Stop reason: HOSPADM

## 2018-03-09 RX ORDER — ALUMINA, MAGNESIA, AND SIMETHICONE 2400; 2400; 240 MG/30ML; MG/30ML; MG/30ML
30 SUSPENSION ORAL EVERY 6 HOURS PRN
Status: DISCONTINUED | OUTPATIENT
Start: 2018-03-09 | End: 2018-03-10 | Stop reason: HOSPADM

## 2018-03-09 RX ORDER — ONDANSETRON 2 MG/ML
4 INJECTION INTRAMUSCULAR; INTRAVENOUS EVERY 6 HOURS PRN
Status: DISCONTINUED | OUTPATIENT
Start: 2018-03-09 | End: 2018-03-11 | Stop reason: HOSPADM

## 2018-03-09 RX ORDER — METHYLERGONOVINE MALEATE 0.2 MG/ML
0.2 INJECTION INTRAVENOUS
Status: DISCONTINUED | OUTPATIENT
Start: 2018-03-09 | End: 2018-03-10 | Stop reason: HOSPADM

## 2018-03-09 RX ORDER — BUPIVACAINE HYDROCHLORIDE 2.5 MG/ML
INJECTION, SOLUTION EPIDURAL; INFILTRATION; INTRACAUDAL
Status: ACTIVE
Start: 2018-03-09 | End: 2018-03-10

## 2018-03-09 RX ORDER — HYDROXYZINE 50 MG/1
50 TABLET, FILM COATED ORAL EVERY 6 HOURS PRN
Status: DISCONTINUED | OUTPATIENT
Start: 2018-03-09 | End: 2018-03-10 | Stop reason: HOSPADM

## 2018-03-09 RX ORDER — OXYCODONE AND ACETAMINOPHEN 10; 325 MG/1; MG/1
1 TABLET ORAL EVERY 4 HOURS PRN
Status: DISCONTINUED | OUTPATIENT
Start: 2018-03-09 | End: 2018-03-11 | Stop reason: HOSPADM

## 2018-03-09 RX ORDER — MISOPROSTOL 200 UG/1
800 TABLET ORAL
Status: DISCONTINUED | OUTPATIENT
Start: 2018-03-09 | End: 2018-03-10 | Stop reason: HOSPADM

## 2018-03-09 RX ORDER — IBUPROFEN 600 MG/1
600 TABLET ORAL EVERY 6 HOURS PRN
Status: DISCONTINUED | OUTPATIENT
Start: 2018-03-09 | End: 2018-03-11 | Stop reason: HOSPADM

## 2018-03-09 RX ORDER — ONDANSETRON 4 MG/1
4 TABLET, ORALLY DISINTEGRATING ORAL EVERY 6 HOURS PRN
Status: DISCONTINUED | OUTPATIENT
Start: 2018-03-09 | End: 2018-03-11 | Stop reason: HOSPADM

## 2018-03-09 RX ORDER — SODIUM CHLORIDE, SODIUM LACTATE, POTASSIUM CHLORIDE, CALCIUM CHLORIDE 600; 310; 30; 20 MG/100ML; MG/100ML; MG/100ML; MG/100ML
INJECTION, SOLUTION INTRAVENOUS
Status: ACTIVE
Start: 2018-03-09 | End: 2018-03-09

## 2018-03-09 RX ORDER — SODIUM CHLORIDE, SODIUM LACTATE, POTASSIUM CHLORIDE, CALCIUM CHLORIDE 600; 310; 30; 20 MG/100ML; MG/100ML; MG/100ML; MG/100ML
INJECTION, SOLUTION INTRAVENOUS
Status: COMPLETED
Start: 2018-03-09 | End: 2018-03-09

## 2018-03-09 RX ORDER — ROPIVACAINE HYDROCHLORIDE 2 MG/ML
INJECTION, SOLUTION EPIDURAL; INFILTRATION; PERINEURAL
Status: COMPLETED
Start: 2018-03-09 | End: 2018-03-09

## 2018-03-09 RX ORDER — SODIUM CHLORIDE, SODIUM LACTATE, POTASSIUM CHLORIDE, CALCIUM CHLORIDE 600; 310; 30; 20 MG/100ML; MG/100ML; MG/100ML; MG/100ML
INJECTION, SOLUTION INTRAVENOUS CONTINUOUS
Status: DISCONTINUED | OUTPATIENT
Start: 2018-03-09 | End: 2018-03-11 | Stop reason: HOSPADM

## 2018-03-09 RX ADMIN — FENTANYL CITRATE 100 MCG: 50 INJECTION, SOLUTION INTRAMUSCULAR; INTRAVENOUS at 16:13

## 2018-03-09 RX ADMIN — FENTANYL CITRATE 100 MCG: 50 INJECTION, SOLUTION INTRAMUSCULAR; INTRAVENOUS at 17:28

## 2018-03-09 RX ADMIN — SODIUM CHLORIDE, SODIUM LACTATE, POTASSIUM CHLORIDE, CALCIUM CHLORIDE: 600; 310; 30; 20 INJECTION, SOLUTION INTRAVENOUS at 17:47

## 2018-03-09 RX ADMIN — DINOPROSTONE 5 MG: 20 SUPPOSITORY VAGINAL at 12:07

## 2018-03-09 RX ADMIN — ONDANSETRON HYDROCHLORIDE 4 MG: 2 INJECTION, SOLUTION INTRAMUSCULAR; INTRAVENOUS at 21:16

## 2018-03-09 RX ADMIN — ROPIVACAINE HYDROCHLORIDE 100 ML: 2 INJECTION, SOLUTION EPIDURAL; INFILTRATION; PERINEURAL at 17:41

## 2018-03-09 RX ADMIN — SODIUM CHLORIDE, POTASSIUM CHLORIDE, SODIUM LACTATE AND CALCIUM CHLORIDE: 600; 310; 30; 20 INJECTION, SOLUTION INTRAVENOUS at 17:47

## 2018-03-09 ASSESSMENT — LIFESTYLE VARIABLES
EVER_SMOKED: NEVER
EVER_SMOKED: NEVER
DO YOU DRINK ALCOHOL: NO
ALCOHOL_USE: NO

## 2018-03-09 NOTE — H&P
History and Physical    Cristin Desai is a 23 y.o. female  -Para:     Gestational Age:  40w2d  Admitted for:   Induction of Labor  Admitted to  [facility].  [facility]    HPI: Patient is admitted with the above mentioned Chief Complaint and States   Loss of fluid:   negative  Abdominal Pain:  negative  Uterine Contractions:  negative  Vaginal Bleeding:  negative  Fetal Movement:  normal  Patient denies fever, chills, nausea, vomiting , headache, visual disturbance, or dysuria  Patient's last menstrual period was 2017.  Estimated Date of Delivery: 3/7/18  Final NAKITA: 3/7/2018, by Last Menstrual Period    Patient Active Problem List    Diagnosis Date Noted   • Rh negative status during pregnancy - Rhogam given 17   • Supervision of normal first pregnancy in first trimester 2017       Admitting DX: Pregnancy  IOL  Pregnancy Complications:  none  OB Risk Factors:   Rh negative  Labor State:    Not in labor.    History:   has a past medical history of Headache(784.0).     has no past surgical history on file.    OB History    Para Term  AB Living   1 0           SAB TAB Ectopic Molar Multiple Live Births                    # Outcome Date GA Lbr Jose Luis/2nd Weight Sex Delivery Anes PTL Lv   1 Current                   Medications:  No current facility-administered medications on file prior to encounter.      Current Outpatient Prescriptions on File Prior to Encounter   Medication Sig Dispense Refill   • Prenatal MV-Min-Fe Fum-FA-DHA (PRENATAL 1 PO) Take  by mouth.         Allergies:  Patient has no known allergies.    ROS:   Neuro: negative    Cardiovascular: negative  Gastro intestinal: negative  Genitourinary: negative            Physical Exam:  LMP 2017   Constitutional: healthy-appearing, Well-developed, well-nourished  No JVD: while supine  HEENT: PERRLA, EOMI and Sclera clear, anicteric  Breast  Exam: negative  Cardio: regular rate and rhythm, S1, S2 normal, no murmur, click, rub or gallop, normal apical impulse  Lung: unlabored respirations, no intercostal retractions or accessory muscle use, clear to auscultation without rales or wheezes  Abdomen: abdomen is soft without significant tenderness, masses, organomegaly or guarding  Extremity: extremities, peripheral pulses and reflexes normal, no edema, redness or tenderness in the calves or thighs    Cervical Exam: 50%  Cervix Dilatation: 2  Station: negative 2  Pelvis: Normal  Fetal Assessment: Fetal heart variability: moderate  Fetal Heart Rate accelerations: yes  Baseline FHR: 130s per minute  Uterine contractions: No Uterine Contractions      Labs:      Prenatal Results     1st Trimester     Test Value Date Time    ABO O  07/21/17     RH NEG  07/21/17     Antibody NEG  07/21/17     CBC/PLT/DIFF       HGB 11.1 g/dL (L) 12/04/17 0845    Platelets 308  k/uL  07/21/17     HGB A1C        1 Hr GCT 90 mg/dL 12/04/17 0845    3 Hr GTT       Rubella IMMUNE  07/21/17     RPR NON REACTIVE  07/21/17     Urine Culture       24 Urine Protein        24 Urine Creatinine        HBsAg NEG  07/21/17     Hep CAB        HIV NON REACITIVE  07/21/17     Gonorrhea NEG  07/21/17     Chlamydia NEG  07/21/17     TSH        Free T4         TB       Pap NEG  07/21/17     SYPHILUS TREP QUAL [id number] [[id number]][             2nd Trimester     Test Value Date Time    HCT 35.0 % (L) 12/04/17 0845    HGB 11.1 g/dL (L) 12/04/17 0845    1 Hr GCT 90 mg/dL 12/04/17 0845    3 Hr GTT             24-28 Weeks     Test Value Date Time    1 Hr GCT  90 mg/dL 12/04/17 0845    TSH        Free T4        24 Urine Protein       24 Urine Creatinine       BUN       Creatinine       GFR       AST       ALT       Uric Acid       LDH             3rd Trimester     Test Value Date Time    HCT 35.0 % (L) 12/04/17 0845    HGB 11.1 g/dL (L) 12/04/17 0845    TSH       Free T4       24 Hr Urine Protein       24 Hr Urine  Creatinine       SYPHILUS TREP QUAL Non Reactive  17 0845          35-37 Weeks     Test Value Date Time    GBS PCR LB Negative  18    GBS PCR Negative  18          Genetic Screening     Test Value Date Time    Cystic Fibrosis       AFP Quad       Sickle Cell                     Assessment:  Gestational Age:  40w2d  Labor State:   Not in labor, induction of labor  Risk Factors:   Rh negative  Pregnancy Complications: None    Patient Active Problem List    Diagnosis Date Noted   • Rh negative status during pregnancy - Rhogam given 17   • Supervision of normal first pregnancy in first trimester 2017       Plan:   Admitted for: Induction of Labor  Given cervical exam and primigravid, will apply gel prior to starting pitocin  Anticipate   CBC, CMP and UA  routine urinalysis  Antibiotics: None    Wandy Pepper M.D.

## 2018-03-09 NOTE — PROGRESS NOTES
1100 - Report received from Karla Villaseñor RN. Assumed care. Pt sitting up in bed.   1125 - SVE - 2/50/-2.   1130 - Discussed poc with Dr Pepper. Per MD, will plan to place gel for IOL.   1207 - Gel placed, See MAR.  1245 - Report given to ELINOR Hernandez RN. All questions answered.

## 2018-03-10 LAB
ACTION RH IMMUNE GLOB 8505RHG: NORMAL
ERYTHROCYTE [DISTWIDTH] IN BLOOD BY AUTOMATED COUNT: 45.8 FL (ref 35.9–50)
HCT VFR BLD AUTO: 27.6 % (ref 37–47)
HGB BLD-MCNC: 8.9 G/DL (ref 12–16)
IMMUNE ROSETTING TEST 8505FMH: NORMAL
MCH RBC QN AUTO: 26.6 PG (ref 27–33)
MCHC RBC AUTO-ENTMCNC: 32.2 G/DL (ref 33.6–35)
MCV RBC AUTO: 82.4 FL (ref 81.4–97.8)
NUMBER OF RH DOSES IND 8505RD: 1
PLATELET # BLD AUTO: 254 K/UL (ref 164–446)
PMV BLD AUTO: 12 FL (ref 9–12.9)
RBC # BLD AUTO: 3.35 M/UL (ref 4.2–5.4)
RH BLD: NORMAL
WBC # BLD AUTO: 18 K/UL (ref 4.8–10.8)

## 2018-03-10 PROCEDURE — 303615 HCHG EPIDURAL/SPINAL ANESTHESIA FOR LABOR

## 2018-03-10 PROCEDURE — 36415 COLL VENOUS BLD VENIPUNCTURE: CPT

## 2018-03-10 PROCEDURE — 85027 COMPLETE CBC AUTOMATED: CPT

## 2018-03-10 PROCEDURE — 0UQMXZZ REPAIR VULVA, EXTERNAL APPROACH: ICD-10-PCS | Performed by: OBSTETRICS & GYNECOLOGY

## 2018-03-10 PROCEDURE — A9270 NON-COVERED ITEM OR SERVICE: HCPCS | Performed by: NURSE PRACTITIONER

## 2018-03-10 PROCEDURE — 770002 HCHG ROOM/CARE - OB PRIVATE (112)

## 2018-03-10 PROCEDURE — 700102 HCHG RX REV CODE 250 W/ 637 OVERRIDE(OP): Performed by: NURSE PRACTITIONER

## 2018-03-10 PROCEDURE — 0DQR0ZZ REPAIR ANAL SPHINCTER, OPEN APPROACH: ICD-10-PCS | Performed by: OBSTETRICS & GYNECOLOGY

## 2018-03-10 PROCEDURE — 86901 BLOOD TYPING SEROLOGIC RH(D): CPT

## 2018-03-10 PROCEDURE — 700111 HCHG RX REV CODE 636 W/ 250 OVERRIDE (IP): Performed by: OBSTETRICS & GYNECOLOGY

## 2018-03-10 PROCEDURE — 85461 HEMOGLOBIN FETAL: CPT

## 2018-03-10 PROCEDURE — 700102 HCHG RX REV CODE 250 W/ 637 OVERRIDE(OP): Performed by: OBSTETRICS & GYNECOLOGY

## 2018-03-10 PROCEDURE — 3E033VJ INTRODUCTION OF OTHER HORMONE INTO PERIPHERAL VEIN, PERCUTANEOUS APPROACH: ICD-10-PCS | Performed by: OBSTETRICS & GYNECOLOGY

## 2018-03-10 PROCEDURE — 4A1HXCZ MONITORING OF PRODUCTS OF CONCEPTION, CARDIAC RATE, EXTERNAL APPROACH: ICD-10-PCS | Performed by: OBSTETRICS & GYNECOLOGY

## 2018-03-10 PROCEDURE — 10H07YZ INSERTION OF OTHER DEVICE INTO PRODUCTS OF CONCEPTION, VIA NATURAL OR ARTIFICIAL OPENING: ICD-10-PCS | Performed by: OBSTETRICS & GYNECOLOGY

## 2018-03-10 PROCEDURE — 59409 OBSTETRICAL CARE: CPT

## 2018-03-10 PROCEDURE — A9270 NON-COVERED ITEM OR SERVICE: HCPCS | Performed by: OBSTETRICS & GYNECOLOGY

## 2018-03-10 PROCEDURE — 700112 HCHG RX REV CODE 229: Performed by: NURSE PRACTITIONER

## 2018-03-10 PROCEDURE — 304965 HCHG RECOVERY SERVICES

## 2018-03-10 RX ORDER — DOCUSATE SODIUM 100 MG/1
100 CAPSULE, LIQUID FILLED ORAL 2 TIMES DAILY PRN
Status: DISCONTINUED | OUTPATIENT
Start: 2018-03-10 | End: 2018-03-11 | Stop reason: HOSPADM

## 2018-03-10 RX ORDER — MISOPROSTOL 200 UG/1
600 TABLET ORAL
Status: DISCONTINUED | OUTPATIENT
Start: 2018-03-10 | End: 2018-03-11 | Stop reason: HOSPADM

## 2018-03-10 RX ORDER — METHYLERGONOVINE MALEATE 0.2 MG/ML
0.2 INJECTION INTRAVENOUS
Status: DISCONTINUED | OUTPATIENT
Start: 2018-03-10 | End: 2018-03-11 | Stop reason: HOSPADM

## 2018-03-10 RX ORDER — MAG HYDROX/ALUMINUM HYD/SIMETH 400-400-40
1 SUSPENSION, ORAL (FINAL DOSE FORM) ORAL
Status: DISCONTINUED | OUTPATIENT
Start: 2018-03-10 | End: 2018-03-11 | Stop reason: HOSPADM

## 2018-03-10 RX ORDER — SODIUM CHLORIDE, SODIUM LACTATE, POTASSIUM CHLORIDE, CALCIUM CHLORIDE 600; 310; 30; 20 MG/100ML; MG/100ML; MG/100ML; MG/100ML
INJECTION, SOLUTION INTRAVENOUS PRN
Status: DISCONTINUED | OUTPATIENT
Start: 2018-03-10 | End: 2018-03-11 | Stop reason: HOSPADM

## 2018-03-10 RX ORDER — VITAMIN A ACETATE, BETA CAROTENE, ASCORBIC ACID, CHOLECALCIFEROL, .ALPHA.-TOCOPHEROL ACETATE, DL-, THIAMINE MONONITRATE, RIBOFLAVIN, NIACINAMIDE, PYRIDOXINE HYDROCHLORIDE, FOLIC ACID, CYANOCOBALAMIN, CALCIUM CARBONATE, FERROUS FUMARATE, ZINC OXIDE, CUPRIC OXIDE 3080; 12; 120; 400; 1; 1.84; 3; 20; 22; 920; 25; 200; 27; 10; 2 [IU]/1; UG/1; MG/1; [IU]/1; MG/1; MG/1; MG/1; MG/1; MG/1; [IU]/1; MG/1; MG/1; MG/1; MG/1; MG/1
1 TABLET, FILM COATED ORAL EVERY MORNING
Status: DISCONTINUED | OUTPATIENT
Start: 2018-03-10 | End: 2018-03-11 | Stop reason: HOSPADM

## 2018-03-10 RX ORDER — BISACODYL 10 MG
10 SUPPOSITORY, RECTAL RECTAL PRN
Status: DISCONTINUED | OUTPATIENT
Start: 2018-03-10 | End: 2018-03-11 | Stop reason: HOSPADM

## 2018-03-10 RX ORDER — CARBOPROST TROMETHAMINE 250 UG/ML
250 INJECTION, SOLUTION INTRAMUSCULAR
Status: DISCONTINUED | OUTPATIENT
Start: 2018-03-10 | End: 2018-03-11 | Stop reason: HOSPADM

## 2018-03-10 RX ADMIN — OXYCODONE HYDROCHLORIDE AND ACETAMINOPHEN 1 TABLET: 5; 325 TABLET ORAL at 12:58

## 2018-03-10 RX ADMIN — OXYTOCIN 125 ML/HR: 10 INJECTION, SOLUTION INTRAMUSCULAR; INTRAVENOUS at 03:55

## 2018-03-10 RX ADMIN — OXYCODONE HYDROCHLORIDE AND ACETAMINOPHEN 1 TABLET: 5; 325 TABLET ORAL at 05:14

## 2018-03-10 RX ADMIN — IBUPROFEN 600 MG: 600 TABLET, FILM COATED ORAL at 12:58

## 2018-03-10 RX ADMIN — OXYCODONE HYDROCHLORIDE AND ACETAMINOPHEN 1 TABLET: 5; 325 TABLET ORAL at 18:15

## 2018-03-10 RX ADMIN — OXYTOCIN 2000 ML/HR: 10 INJECTION, SOLUTION INTRAMUSCULAR; INTRAVENOUS at 01:53

## 2018-03-10 RX ADMIN — IBUPROFEN 600 MG: 600 TABLET, FILM COATED ORAL at 05:13

## 2018-03-10 RX ADMIN — Medication 1 TABLET: at 08:17

## 2018-03-10 RX ADMIN — DOCUSATE SODIUM 100 MG: 100 CAPSULE ORAL at 20:45

## 2018-03-10 RX ADMIN — IBUPROFEN 600 MG: 600 TABLET, FILM COATED ORAL at 20:45

## 2018-03-10 ASSESSMENT — PAIN SCALES - GENERAL
PAINLEVEL_OUTOF10: 3
PAINLEVEL_OUTOF10: 4
PAINLEVEL_OUTOF10: 6
PAINLEVEL_OUTOF10: 4
PAINLEVEL_OUTOF10: 6
PAINLEVEL_OUTOF10: 6
PAINLEVEL_OUTOF10: 0
PAINLEVEL_OUTOF10: 4

## 2018-03-10 NOTE — PROGRESS NOTES
PATIENT ID:  NAME:  Cristin Desai  MRN:               [id number]  YOB: 1995    Subjective:   Patient's water has broken spontaneously as we were preparing to AROM.      Objective:    Vitals:    18 1804 18 1807 18 1812 18 1840   BP: 132/78 137/77 139/86 138/90   Pulse: 81 88 92    Temp:       Weight:       Height:           Cervix:  5-6cm/90%/-2, SROM @ 21:17  clear  [place]: Uterine Contractions Q 3 minutes.   FHRM: Baseline 120 , moderate variability, Accels present, no decels   Pitocin: none  Pain none    Assessment:   23 y.o. female  at 40w2d.  EFW: 3200g  GBS negative  Vertex     Plan:   1. LR for IV hydration  2. IUPC in situ  3. Epidural in situ  4. Continuous FHR monitoring and maternal monitoring   5. Pt sitting in throne position   6. Anticipate

## 2018-03-10 NOTE — CONSULTS
Mom desires to breastfeed but after initial latch in transition, infant offered and didn't sustain and mom felt baby was hungry.  Education provided and mom still wanted to use formula.  10ml provided.    Discussed establishing breastfeeding and watching for cues.  Mom comfortable with keeping baby skin to skin and offering every 2 hours. Recommendation to not use even small amounts of formula today to allow infant to learn breastfeeding.  Mom seems in agreement. She will call RN for assistance with latch.    Written information provided

## 2018-03-10 NOTE — PROGRESS NOTES
1245 - report from HELIO Shell RN. ARASH Valle and grandfather Sudha at bedside. POC discussed, reeducated on hand hygiene, call light, emergency light, [employer] and [employer]. Questions encouraged and answered, understanding verbalized.  1613 - medicated for pain per MD order (see MAR).   1728 - requesting epidural however anesthesiologist in OR, desires another dose of fentanyl. Medicated for pain per MD order (see MAR).   1744 - Dr. Ochoa at bedside for epidural placement, tolerated very well.   1815 - hatch inserted under epidural analgesia, SVE 4/90/-2.  2117 - DELIA Wallace CNM at bedside. POC discussed, questions answered. SVE 5-6/90/-2. AROM clear fluid, IUPC placed, tolerated well.   0043 - SVE complete/+2. DELIA Wallace CNM called for delivery.   0100 - report to JAUN Coleman RN.

## 2018-03-10 NOTE — CARE PLAN
Problem: Pain  Goal: Alleviation of Pain or a reduction in pain to the patient's comfort goal  Outcome: PROGRESSING AS EXPECTED  Excellent pain relief reported with epidural analgesia.     Problem: Risk for Infection, Impaired Wound Healing  Goal: Remain free from signs and symptoms of infection  Outcome: PROGRESSING AS EXPECTED  VSS, no s/s of infection noted upon assessment. Educated pt and family on infection prevention and hand hygiene.

## 2018-03-10 NOTE — DELIVERY NOTE
Delivery Note    PATIENT ID:  NAME:  Cristin Desai  MRN:               [id number]  YOB: 1995      Labor Course: Patient was admitted for IOL. Progressed well with gel and pitocin.     On 3/10/2018  at 01:43, this 23 y.o.,  40w3d now  , GBS negative female delivered via  under epidural anesthesia a viable female infant weighing 8 lbs and 3 oz (3715 grams) with APGAR scores of 8 and 9 at one and five minutes. One tight nuchal cord present which was not reducible but was slipped with ease over baby's shoulders.  Baby to maternal abdomen.  Spontaneous cry.  Mouth and nares bulb suctioned by RN. Delayed cord clamping occurred with cord doubly clamped by myself and cut by FOB.  Spontaneous delivery of placenta grossly intact at 01:53.  CVx3. Pitocin infusing in IVF.  FF and bleeding light.  Upon vaginal exam, there was third degree perineal laceration which was repaired by Dr. Modesto Berrois using 2.0 and 3.0  in the usual sterile fashion. I repaired bilateral labial first degree with SH. Pt and infant are stable and bonding.  Estimated blood loss: 200.      NORBERT Riggins Dr., Attending Physician

## 2018-03-10 NOTE — CARE PLAN
Problem: Altered physiologic condition related to immediate post-delivery state and potential for bleeding/hemorrhage  Goal: Patient physiologically stable as evidenced by normal lochia, palpable uterine involution and vital signs within normal limits  Outcome: PROGRESSING AS EXPECTED  Lochia light, fundus firm, VSS.     Problem: Alteration in comfort related to episiotomy, vaginal repair and/or after birth pains  Goal: Patient verbalizes acceptable pain level  Outcome: PROGRESSING AS EXPECTED  Pain management plan reviewed with patient. Medicated for pain and alternative pain relief methods offered. She will call if she wants pain meds. PRN times updated on whiteboard.

## 2018-03-10 NOTE — PROGRESS NOTES
Report received from L&D RN Bronwny. Plan of care reviewed with patient and significant other. All policies and procedures reviewed including feedings, keeping infant bundled when not skin/skin, security measures, call light, emergency cord, erma light and keeping infant in crib when outside of room. Assessment completed. Rounding in place.

## 2018-03-10 NOTE — PROGRESS NOTES
"0335- pt assisted to put infant to breast. Infant latched to left side. Pt encouraged r/t colostrum and importance of putting infant to breast frequently. Pt states her legs are feeling \"less tingly\". RN instructed patient to call out when infant done feeding before attempting to get out of bed.     0420- pt assisted out of bed, stable on feet. Pt to br, able to void. pericare education per rn. Pt assisted to wheelchair.   "

## 2018-03-11 VITALS
DIASTOLIC BLOOD PRESSURE: 76 MMHG | HEIGHT: 66 IN | RESPIRATION RATE: 18 BRPM | SYSTOLIC BLOOD PRESSURE: 111 MMHG | OXYGEN SATURATION: 98 % | WEIGHT: 191 LBS | TEMPERATURE: 97.9 F | BODY MASS INDEX: 30.7 KG/M2 | HEART RATE: 76 BPM

## 2018-03-11 PROCEDURE — 700112 HCHG RX REV CODE 229: Performed by: NURSE PRACTITIONER

## 2018-03-11 PROCEDURE — A9270 NON-COVERED ITEM OR SERVICE: HCPCS | Performed by: NURSE PRACTITIONER

## 2018-03-11 PROCEDURE — A9270 NON-COVERED ITEM OR SERVICE: HCPCS | Performed by: OBSTETRICS & GYNECOLOGY

## 2018-03-11 PROCEDURE — 700102 HCHG RX REV CODE 250 W/ 637 OVERRIDE(OP): Performed by: NURSE PRACTITIONER

## 2018-03-11 PROCEDURE — 700102 HCHG RX REV CODE 250 W/ 637 OVERRIDE(OP): Performed by: STUDENT IN AN ORGANIZED HEALTH CARE EDUCATION/TRAINING PROGRAM

## 2018-03-11 PROCEDURE — 700102 HCHG RX REV CODE 250 W/ 637 OVERRIDE(OP): Performed by: OBSTETRICS & GYNECOLOGY

## 2018-03-11 PROCEDURE — A9270 NON-COVERED ITEM OR SERVICE: HCPCS | Performed by: STUDENT IN AN ORGANIZED HEALTH CARE EDUCATION/TRAINING PROGRAM

## 2018-03-11 RX ORDER — FERROUS SULFATE 325(65) MG
325 TABLET ORAL 2 TIMES DAILY WITH MEALS
Status: DISCONTINUED | OUTPATIENT
Start: 2018-03-11 | End: 2018-03-11 | Stop reason: HOSPADM

## 2018-03-11 RX ORDER — FERROUS SULFATE 325(65) MG
325 TABLET ORAL 2 TIMES DAILY WITH MEALS
Qty: 60 TAB | Refills: 0 | Status: SHIPPED | OUTPATIENT
Start: 2018-03-11 | End: 2021-07-27

## 2018-03-11 RX ORDER — IBUPROFEN 600 MG/1
600 TABLET ORAL EVERY 6 HOURS PRN
Qty: 60 TAB | Refills: 0 | Status: SHIPPED | OUTPATIENT
Start: 2018-03-11 | End: 2021-07-27

## 2018-03-11 RX ORDER — PSEUDOEPHEDRINE HCL 30 MG
100 TABLET ORAL 2 TIMES DAILY PRN
Qty: 60 CAP | Refills: 0 | Status: SHIPPED | OUTPATIENT
Start: 2018-03-11 | End: 2021-07-27

## 2018-03-11 RX ADMIN — OXYCODONE HYDROCHLORIDE AND ACETAMINOPHEN 1 TABLET: 5; 325 TABLET ORAL at 09:51

## 2018-03-11 RX ADMIN — Medication 325 MG: at 09:47

## 2018-03-11 RX ADMIN — OXYCODONE HYDROCHLORIDE AND ACETAMINOPHEN 1 TABLET: 5; 325 TABLET ORAL at 04:55

## 2018-03-11 RX ADMIN — Medication 1 TABLET: at 09:47

## 2018-03-11 RX ADMIN — DOCUSATE SODIUM 100 MG: 100 CAPSULE ORAL at 09:47

## 2018-03-11 RX ADMIN — IBUPROFEN 600 MG: 600 TABLET, FILM COATED ORAL at 04:55

## 2018-03-11 ASSESSMENT — PAIN SCALES - GENERAL
PAINLEVEL_OUTOF10: 6
PAINLEVEL_OUTOF10: 5
PAINLEVEL_OUTOF10: 2
PAINLEVEL_OUTOF10: 3

## 2018-03-11 NOTE — PROGRESS NOTES
0945-Patient alert, oriented.  Vital signs stable. Father of baby at bedside.  Fundus firm@U, lochia light.  Patient using fran bottle, voiding without difficulty.  Patient medicated for complaints of pain.  Encouraged to call with needs.  Patient bonding well with infant.  1145- Discharge education discussed with patient.  Patient verbalized understanding.  1215- Patient discharged home with infant via wheelchair in stable condition.

## 2018-03-11 NOTE — DISCHARGE SUMMARY
Discharge Summary:      Cristin Desai      Admit Date:   3/9/2018  Discharge Date:  3/11/2018     Admitting diagnosis:  Pregnancy   (normal spontaneous vaginal delivery)  Labor and delivery, indication for care  Discharge Diagnosis: Status post vaginal, spontaneous.  Pregnancy Complications: none  Tubal Ligation:  no        History:  Past Medical History:   Diagnosis Date   • Headache(784.0)      OB History    Para Term  AB Living   1 0           SAB TAB Ectopic Molar Multiple Live Births                    # Outcome Date GA Lbr Jose Luis/2nd Weight Sex Delivery Anes PTL Lv   1 Current                    Patient has no known allergies.  Patient Active Problem List    Diagnosis Date Noted   •  (normal spontaneous vaginal delivery) 2018   • Rh negative status during pregnancy - Rhogam given 17   • Supervision of normal first pregnancy in first trimester 2017        Hospital Course:   23 y.o. , now para 1, was admitted with the above mentioned diagnosis, underwent Induction of Labor, vaginal, spontaneous. Patient postpartum course was unremarkable, with progressive advancement in diet , ambulation and toleration of oral analgesia. Patient without complaints today and desires discharge.      Vitals:    03/10/18 0455 03/10/18 0800 03/10/18 1200 03/10/18 2000   BP: 122/83 122/78 124/80 131/83   Pulse: 93 83 85 83   Resp:    Temp: 36.6 °C (97.8 °F) 36.4 °C (97.5 °F) 36.7 °C (98.1 °F) 36.8 °C (98.3 °F)   TempSrc:       SpO2: 97% 98% 99% 96%   Weight:       Height:           Current Facility-Administered Medications   Medication Dose   • ferrous sulfate tablet 325 mg  325 mg   • LR infusion     • PRN oxytocin (PITOCIN) (20 Units/1000 mL) PRN for excessive uterine bleeding - See Admin Instr  125-999 mL/hr   • miSOPROStol (CYTOTEC) tablet 600 mcg  600 mcg   • methylergonovine (METHERGINE) injection 0.2 mg  0.2 mg   • carboPROST (HEMABATE) injection 250 mcg   250 mcg   • docusate sodium (COLACE) capsule 100 mg  100 mg   • bisacodyl (DULCOLAX) suppository 10 mg  10 mg   • glycerin (adult) suppository 1 Suppository  1 Suppository   • magnesium hydroxide (MILK OF MAGNESIA) suspension 30 mL  30 mL   • prenatal plus vitamin (STUARTNATAL 1+1) 27-1 MG tablet 1 Tab  1 Tab   • ondansetron (ZOFRAN ODT) dispertab 4 mg  4 mg    Or   • ondansetron (ZOFRAN) syringe/vial injection 4 mg  4 mg   • oxytocin (PITOCIN) infusion (for postpartum)   mL/hr   • ibuprofen (MOTRIN) tablet 600 mg  600 mg   • acetaminophen (TYLENOL) tablet 325 mg  325 mg   • oxyCODONE-acetaminophen (PERCOCET) 5-325 MG per tablet 1 Tab  1 Tab   • oxyCODONE-acetaminophen (PERCOCET-10)  MG per tablet 1 Tab  1 Tab   • lactated ringers infusion         Exam:  Breast Exam: negative  Abdomen: Abdomen soft, non-tender. BS normal. No masses,  No organomegaly  Fundus Non Tender: yes  Incision: none  Perineum: perineum intact  Extremity: extremities, peripheral pulses and reflexes normal, no edema, redness or tenderness in the calves or thighs     Labs:  Recent Labs      03/09/18   0955  03/10/18   2025   WBC  14.3*  18.0*   RBC  3.89*  3.35*   HEMOGLOBIN  10.2*  8.9*   HEMATOCRIT  32.3*  27.6*   MCV  83.0  82.4   MCH  26.2*  26.6*   MCHC  31.6*  32.2*   RDW  45.6  45.8   PLATELETCT  283  254   MPV  12.1  12.0        Activity:   Discharge to home  Pelvic Rest x 6 weeks    Assessment:  normal postpartum course  Discharge Assessment: No heavy bleeding or foul vaginal discharge , Voiding without difficulty, Taking adequate diet and fluids     Follow up: .[facility] or [facility] in 5 weeks for vaginal; 1 week for incision check.   To resume daily PNV and iron supplement if needed with hydration.   Patient to RT TPC or ER if any of the following occur:  Fever over 100.5  Severe abdominal pain  Red streaks or painful masses in the breasts  Foul smelling discharge or lochia  Heavy vaginal bleeding saturating a pad  per hour  S/s of PP depression     Discharge Meds:   Current Outpatient Prescriptions   Medication Sig Dispense Refill   • ibuprofen (MOTRIN) 600 MG Tab Take 1 Tab by mouth every 6 hours as needed (For cramping after delivery; do not give if patient is receiving ketorolac (Toradol)). 60 Tab 0   • ferrous sulfate 325 (65 Fe) MG tablet Take 1 Tab by mouth 2 times a day, with meals. 60 Tab 0   • docusate sodium 100 MG Cap Take 100 mg by mouth 2 times a day as needed for Constipation. 60 Cap 0       Wandy Pepper M.D.

## 2018-03-11 NOTE — DISCHARGE PLANNING
:    Referral: [facility] prior to pregnancy.    Intervention:  Reviewed medical record and met with parents: Leonard Lorenzana and Cristin Desai who delivered their first baby.  Verified address and phone number and the face sheet is correct.  Parent's state they are well-prepared for infant and deny any needs.  They plan on adding infant onto their insurance ([employer]).  Provided parents with a list of pediatricians, children's resource list, and a diaper bank referral.  MOB states she does not qualify for WIC.      MOB denies any drug or alcohol use during the pregnancy.  Infant's UDS is negative.    Plan:  Infant is cleared to discharge home with MOB.

## 2018-03-11 NOTE — PROGRESS NOTES
Patient assessment completed. Plan of care reviewed with patient and significant other, all questions answered at this time. Rounding in place.

## 2018-03-11 NOTE — CARE PLAN
Problem: Alteration in comfort related to episiotomy, vaginal repair and/or after birth pains  Goal: Patient is able to ambulate, care for self and infant  Outcome: PROGRESSING AS EXPECTED  Patient ambulates and cares well for self and infant independently. Medicated for pain per MAR. She wishes to call if she requires pain medicine. PRN times updated on whiteboard.     Problem: Potential knowledge deficit related to lack of understanding of self and  care  Goal: Patient will verbalize understanding of self and infant care  Outcome: PROGRESSING AS EXPECTED  Discharge education reviewed with patient.

## 2018-03-11 NOTE — DISCHARGE INSTRUCTIONS
POSTPARTUM DISCHARGE INSTRUCTIONS FOR MOM    YOB: 1995   Age: 23 y.o.               Admit Date: 3/9/2018     Discharge Date: 3/11/2018  Attending Doctor:  Omer Santamaria M.D.                  Allergies:  Patient has no known allergies.    Discharged to home by car. Discharged via wheelchair, hospital escort: Yes.  Special equipment needed: Not Applicable  Belongings with: Personal  Be sure to schedule a follow-up appointment with your primary care doctor or any specialists as instructed.     Discharge Plan:   Diet Plan: Discussed  Activity Level: Discussed  Confirmed Follow up Appointment: Patient to Call and Schedule Appointment  Confirmed Symptoms Management: Discussed  Medication Reconciliation Updated: Yes  Influenza Vaccine Indication: Not indicated: Previously immunized this influenza season and > 8 years of age    REASONS TO CALL YOUR OBSTETRICIAN:  1.   Persistent fever or shaking chills (Temperature higher than 100.4)  2.   Heavy bleeding (soaking more than 1 pad per hour); Passing clots  3.   Foul odor from vagina  4.   Mastitis (Breast infection; breast pain, chills, fever, redness)  5.   Urinary pain, burning or frequency  6.   Episiotomy infection  7.   Severe depression longer than 24 hours    HAND WASHING  · Prior to handling the baby.  · Before breastfeeding or bottle feeding baby.  · After using the bathroom or changing the baby's diaper.    WOUND CARE  Ask your physician for additional care instructions.  In general:    ·  Incision:      · Keep clean and dry.    · Do NOT lift anything heavier than your baby for up to 6 weeks.    · There should not be any opening or pus.      VAGINAL CARE  · Nothing inside vagina for 6 weeks: no sexual intercourse, tampons or douching.  · Bleeding may continue for 2-4 weeks.  Amount may vary.    · Call your physician for heavy bleeding which means soaking more than 1 pad per hour    BIRTH CONTROL  · It is possible to become pregnant at any time  "after delivery and while breastfeeding.  · Plan to discuss a method of birth control with your physician at your follow up visit. visit.    DIET AND ELIMINATION  · Eating more fiber (bran cereal, fruits, and vegetables) and drinking plenty of fluids will help to avoid constipation.  · Urinary frequency after childbirth is normal.    POSTPARTUM BLUES  During the first few days after birth, you may experience a sense of the \"blues\" which may include impatience, irritability or even crying.  These feeling come and go quickly.  However, as many as 1 in 10 women experience emotional symptoms known as postpartum depression.    Postpartum depression:  May start as early as the second or third day after delivery or take several weeks or months to develop.  Symptoms of \"blues\" are present, but are more intense:  Crying spells; loss of appetite; feelings of hopelessness or loss of control; fear of touching the baby; over concern or no concern at all about the baby; little or no concern about your own appearance/caring for yourself; and/or inability to sleep or excessive sleeping.  Contact your physician if you are experiencing any of these symptoms.    Crisis Hotline:  · [place] Crisis Hotline:  2-334-ZFNCCZZ  Or 1-173.887.2809  · [place] Crisis Hotline:  1-844.311.8102  Or 971-183-7804    PREVENTING SHAKEN BABY:  If you are angry or stressed, PUT THE BABY IN THE CRIB, step away, take some deep breaths, and wait until you are calm to care for the baby.  DO NOT SHAKE THE BABY.  You are not alone, call a supporter for help.    · Crisis Call Center 24/7 crisis line 846-869-8186 or 1-355.438.6278  · You can also text them, text \"ANSWER\" to 463949    QUIT SMOKING/TOBACCO USE:  I understand the use of any tobacco products increases my chance of suffering from future heart disease and could cause other illnesses which may shorten my life. Quitting the use of tobacco products is the single most important thing I can do to improve my " health. For further information on smoking / tobacco cessation call a Toll Free Quit Line at 1-709.637.7461 (*National [employer] [place]) or 1-332.707.4221 (American Lung Association) or you can access the web based program at www.lungusa.org.    · [place] Tobacco Users Help Line:  (773) 831-5090       Toll Free: 1-198.348.6252  · Quit Tobacco Program [facility] (707)270-8891    DEPRESSION / SUICIDE RISK:  As you are discharged from this [facility], it is important to learn how to keep safe from harming yourself.    Recognize the warning signs:  · Abrupt changes in personality, positive or negative- including increase in energy   · Giving away possessions  · Change in eating patterns- significant weight changes-  positive or negative  · Change in sleeping patterns- unable to sleep or sleeping all the time   · Unwillingness or inability to communicate  · Depression  · Unusual sadness, discouragement and loneliness  · Talk of wanting to die  · Neglect of personal appearance   · Rebelliousness- reckless behavior  · Withdrawal from people/activities they love  · Confusion- inability to concentrate     If you or a loved one observes any of these behaviors or has concerns about self-harm, here's what you can do:  · Talk about it- your feelings and reasons for harming yourself  · Remove any means that you might use to hurt yourself (examples: pills, rope, extension cords, firearm)  · Get professional help from the community (Mental Health, Substance Abuse, psychological counseling)  · Do not be alone:Call your Safe Contact- someone whom you trust who will be there for you.  · Call your local CRISIS HOTLINE 744-8514 or 025-870-0120  · Call your local Children's Mobile Crisis Response Team [place] (722) 405-4740 or www.Healthiest You  · Call the toll free National Suicide Prevention Hotlines   · National Suicide Prevention Lifeline 621-555-BDOR (6761)  · National Hope Line Network  800-SUICIDE (897-6739)    DISCHARGE SURVEY:  Thank you for choosing [facility].  We hope we provided you with very good care.  You may be receiving a survey in the mail.  Please fill it out.  Your opinion is valuable to us.    ADDITIONAL EDUCATIONAL MATERIALS GIVEN TO PATIENT:        My signature on this form indicates that:  1.  I have reviewed and understand the above information  2.  My questions regarding this information have been answered to my satisfaction.  3.  I have formulated a plan with my discharge nurse to obtain my prescribed medication for home.

## 2018-04-11 ENCOUNTER — POST PARTUM (OUTPATIENT)
Dept: OBGYN | Facility: CLINIC | Age: 23
End: 2018-04-11
Payer: COMMERCIAL

## 2018-04-11 VITALS
SYSTOLIC BLOOD PRESSURE: 100 MMHG | WEIGHT: 167 LBS | BODY MASS INDEX: 26.84 KG/M2 | DIASTOLIC BLOOD PRESSURE: 62 MMHG | HEIGHT: 66 IN

## 2018-04-11 PROBLEM — Z34.01 SUPERVISION OF NORMAL FIRST PREGNANCY IN FIRST TRIMESTER: Status: RESOLVED | Noted: 2017-08-25 | Resolved: 2018-04-11

## 2018-04-11 PROBLEM — O26.892 RH NEGATIVE STATUS DURING PREGNANCY IN SECOND TRIMESTER: Status: RESOLVED | Noted: 2017-09-22 | Resolved: 2018-04-11

## 2018-04-11 PROBLEM — Z67.91 RH NEGATIVE STATUS DURING PREGNANCY IN SECOND TRIMESTER: Status: RESOLVED | Noted: 2017-09-22 | Resolved: 2018-04-11

## 2018-04-11 PROCEDURE — 90050 PR POSTPARTUM VISIT: CPT | Performed by: NURSE PRACTITIONER

## 2018-04-11 ASSESSMENT — ENCOUNTER SYMPTOMS
MUSCULOSKELETAL NEGATIVE: 1
CONSTITUTIONAL NEGATIVE: 1
CARDIOVASCULAR NEGATIVE: 1
GASTROINTESTINAL NEGATIVE: 1
EYES NEGATIVE: 1
PSYCHIATRIC NEGATIVE: 1
RESPIRATORY NEGATIVE: 1
NEUROLOGICAL NEGATIVE: 1

## 2018-04-11 ASSESSMENT — PATIENT HEALTH QUESTIONNAIRE - PHQ9: CLINICAL INTERPRETATION OF PHQ2 SCORE: 0

## 2018-04-11 NOTE — PROGRESS NOTES
"  Subjective:    Cristin Desai is a 23 y.o. female who presents for her postpartum exam 4.5 weeks following . Her prenatal course was uncomplicated. She denies dysuria, vaginal bleeding, odor, itching or breast problems. She is bottle feeding. She is unsure of what she wants for her birth control method. Discussed all forms of birth control. Is considering Nexplanon vs IUD. Will talk to  and call clinic when she has decided. Reports no sex prior to this appointment. Eating a regular diet without difficulty. Bowel movement are Normal.  The patient is not having any pain. Stopped bleeding 2 days ago. Patient Denies Incisional pain, drainage or redness. Patient denies any s/sx of postpartum depression.     Problem List     Patient Active Problem List    Diagnosis Date Noted   •  (normal spontaneous vaginal delivery) 2018       Objective    See PE  Lab: H&H at d/c: 8.9/27.6  /62   Ht 1.676 m (5' 6\")   Wt 75.8 kg (167 lb)   LMP 2017   Breastfeeding? No   BMI 26.95 kg/m²     Assessment:    1. Exam WNL   2. Pap WNL on 2017  3. Desires contraception- condoms until LARC placed      Plan:    1. Contraceptive counseling - follow up w health dept,  [facility], or [facility] for contraceptive changes, women's health care needs, or future pregnancy  2. Encouraged condom use for STI and pregnancy protection  3. Discussed diet, exercise and resumption of sexual activity   4. Preconception guidance for next pregnancy if applicable. Discussed pregnancy spacing risk factors. Folic acid for all women of childbearing age.     HPI    Review of Systems   Constitutional: Negative.    HENT: Negative.    Eyes: Negative.    Respiratory: Negative.    Cardiovascular: Negative.    Gastrointestinal: Negative.    Genitourinary: Negative.    Musculoskeletal: Negative.    Skin: Negative.    Neurological: Negative.    Endo/Heme/Allergies: Negative.    Psychiatric/Behavioral: Negative.    All other " "systems reviewed and are negative.         Objective:     /62   Ht 1.676 m (5' 6\")   Wt 75.8 kg (167 lb)   LMP 2017   Breastfeeding? No   BMI 26.95 kg/m²      Physical Exam   Constitutional: She is oriented to person, place, and time. She appears well-developed and well-nourished.   HENT:   Head: Normocephalic and atraumatic.   Nose: Nose normal.   Eyes: Conjunctivae and EOM are normal.   Neck: Normal range of motion.   Cardiovascular: Normal rate, regular rhythm and normal heart sounds.    Pulmonary/Chest: Effort normal and breath sounds normal.   Abdominal: Soft. Bowel sounds are normal.   Genitourinary: Vagina normal and uterus normal.   Musculoskeletal: Normal range of motion.   Neurological: She is alert and oriented to person, place, and time.   Skin: Skin is warm and dry. Capillary refill takes less than 2 seconds.   Psychiatric: She has a normal mood and affect. Her behavior is normal. Judgment and thought content normal.   Nursing note and vitals reviewed.         Assessment/Plan:     1. Postpartum care and examination   3/10/18      "

## 2018-04-11 NOTE — NON-PROVIDER
Pt here today for postpartum exam.  Delivery type & date: 03/10/2018 Vaginal delivery   Birth control method desired: IUD   Currently : bottle feeding   LMP: not yet  Last pap: 08/29/2017 normal   Phone # [phone]  Sad, or crying : No  C/O pt report no problems at this time

## 2019-09-12 ENCOUNTER — NON-PROVIDER VISIT (OUTPATIENT)
Dept: URGENT CARE | Facility: PHYSICIAN GROUP | Age: 24
End: 2019-09-12

## 2019-09-12 DIAGNOSIS — Z02.1 PRE-EMPLOYMENT DRUG SCREENING: ICD-10-CM

## 2019-09-12 LAB
AMP AMPHETAMINE: NORMAL
COC COCAINE: NORMAL
INT CON NEG: NEGATIVE
INT CON POS: POSITIVE
MET METHAMPHETAMINES: NORMAL
OPI OPIATES: NORMAL
PCP PHENCYCLIDINE: NORMAL
POC DRUG COMMENT 753798-OCCUPATIONAL HEALTH: NORMAL
THC: NORMAL

## 2019-09-12 PROCEDURE — 80305 DRUG TEST PRSMV DIR OPT OBS: CPT | Performed by: PHYSICIAN ASSISTANT

## 2019-09-18 ENCOUNTER — NON-PROVIDER VISIT (OUTPATIENT)
Dept: OCCUPATIONAL MEDICINE | Facility: CLINIC | Age: 24
End: 2019-09-18

## 2019-09-18 DIAGNOSIS — Z02.83 ENCOUNTER FOR DRUG SCREENING: ICD-10-CM

## 2019-09-18 PROCEDURE — 8911 PR MRO FEE: Performed by: PREVENTIVE MEDICINE

## 2019-11-16 ENCOUNTER — OFFICE VISIT (OUTPATIENT)
Dept: URGENT CARE | Facility: PHYSICIAN GROUP | Age: 24
End: 2019-11-16

## 2019-11-16 VITALS
BODY MASS INDEX: 23.46 KG/M2 | RESPIRATION RATE: 16 BRPM | WEIGHT: 146 LBS | HEART RATE: 76 BPM | TEMPERATURE: 98 F | HEIGHT: 66 IN | DIASTOLIC BLOOD PRESSURE: 60 MMHG | OXYGEN SATURATION: 98 % | SYSTOLIC BLOOD PRESSURE: 102 MMHG

## 2019-11-16 DIAGNOSIS — H60.313 ACUTE DIFFUSE OTITIS EXTERNA OF BOTH EARS: ICD-10-CM

## 2019-11-16 PROCEDURE — 99202 OFFICE O/P NEW SF 15 MIN: CPT | Performed by: FAMILY MEDICINE

## 2019-11-16 ASSESSMENT — ENCOUNTER SYMPTOMS
CHILLS: 0
DIZZINESS: 0
MYALGIAS: 0
SORE THROAT: 0
SHORTNESS OF BREATH: 0
VOMITING: 0
NAUSEA: 0
FEVER: 0
EYE PAIN: 0

## 2019-11-16 NOTE — PROGRESS NOTES
"Subjective:   Cristin Desai is a 24 y.o. female who presents for Otalgia (Left ear itch and odor left ear x 6 months starting on right side)     24-year-old presents with left ear pain for the past 7 months which recent involvement in the right ear for the last 4 months.    Otalgia    There is pain in both ears. This is a new problem. The problem occurs constantly. The problem has been unchanged. There has been no fever. The pain is mild. Pertinent negatives include no rash, sore throat or vomiting.     Review of Systems   Constitutional: Negative for chills and fever.   HENT: Positive for ear pain. Negative for sore throat.    Eyes: Negative for pain.   Respiratory: Negative for shortness of breath.    Cardiovascular: Negative for chest pain.   Gastrointestinal: Negative for nausea and vomiting.   Genitourinary: Negative for hematuria.   Musculoskeletal: Negative for myalgias.   Skin: Negative for rash.   Neurological: Negative for dizziness.      Objective:   /60   Pulse 76   Temp 36.7 °C (98 °F)   Resp 16   Ht 1.676 m (5' 6\")   Wt 66.2 kg (146 lb)   SpO2 98%   BMI 23.57 kg/m²   Physical Exam  Constitutional:       General: She is not in acute distress.     Appearance: She is well-developed.   HENT:      Head: Normocephalic and atraumatic.      Right Ear: Swelling present. Tympanic membrane is not injected.      Left Ear: Swelling present. Tympanic membrane is not injected.   Eyes:      Conjunctiva/sclera: Conjunctivae normal.      Pupils: Pupils are equal, round, and reactive to light.   Cardiovascular:      Rate and Rhythm: Normal rate and regular rhythm.      Heart sounds: No murmur.   Pulmonary:      Effort: Pulmonary effort is normal. No respiratory distress.      Breath sounds: Normal breath sounds.   Abdominal:      General: There is no distension.      Palpations: Abdomen is soft.      Tenderness: There is no tenderness.   Musculoskeletal: Normal range of motion.   Skin:     General: " Skin is warm and dry.   Neurological:      General: No focal deficit present.      Mental Status: She is alert and oriented to person, place, and time. Mental status is at baseline.      Gait: Gait (gait at baseline) normal.   Psychiatric:         Judgment: Judgment normal.          Assessment/Plan:   1. Acute diffuse otitis externa of both ears  - neomycin/colistin/thonz/HC (CORTISPORIN-TC) 3.3-3-10-0.5 MG/ML Suspension; Place 5 Drops in ear 3 times a day for 7 days.  Dispense: 10 mL; Refill: 1    Differential diagnosis, natural history, supportive care, and indications for immediate follow-up discussed.    Advised the patient to follow-up with the primary care physician for recheck, reevaluation, and consideration of further management.

## 2019-11-16 NOTE — PATIENT INSTRUCTIONS
"Otitis Externa  Otitis externa is a germ infection in the outer ear. The outer ear is the area from the eardrum to the outside of the ear. Otitis externa is sometimes called \"swimmer's ear.\"  HOME CARE  · Put drops in the ear as told by your doctor.  · Only take medicine as told by your doctor.  · If you have diabetes, your doctor may give you more directions. Follow your doctor's directions.  · Keep all doctor visits as told.  To avoid another infection:  · Keep your ear dry. Use the corner of a towel to dry your ear after swimming or bathing.  · Avoid scratching or putting things inside your ear.  · Avoid swimming in lakes, dirty water, or pools that use a chemical called chlorine poorly.  · You may use ear drops after swimming. Combine equal amounts of white vinegar and alcohol in a bottle. Put 3 or 4 drops in each ear.  GET HELP IF:   · You have a fever.  · Your ear is still red, puffy (swollen), or painful after 3 days.  · You still have yellowish-white fluid (pus) coming from the ear after 3 days.  · Your redness, puffiness, or pain gets worse.  · You have a really bad headache.  · You have redness, puffiness, pain, or tenderness behind your ear.  MAKE SURE YOU:   · Understand these instructions.  · Will watch your condition.  · Will get help right away if you are not doing well or get worse.  This information is not intended to replace advice given to you by your health care provider. Make sure you discuss any questions you have with your health care provider.  Document Released: 06/05/2009 Document Revised: 01/08/2016 Document Reviewed: 09/26/2016  [employer] Patient Education © 2017 [employer]    "

## 2021-07-27 ENCOUNTER — OFFICE VISIT (OUTPATIENT)
Dept: URGENT CARE | Facility: PHYSICIAN GROUP | Age: 26
End: 2021-07-27
Payer: OTHER GOVERNMENT

## 2021-07-27 ENCOUNTER — HOSPITAL ENCOUNTER (OUTPATIENT)
Facility: MEDICAL CENTER | Age: 26
End: 2021-07-27
Attending: FAMILY MEDICINE

## 2021-07-27 VITALS
BODY MASS INDEX: 23.3 KG/M2 | HEIGHT: 66 IN | RESPIRATION RATE: 16 BRPM | DIASTOLIC BLOOD PRESSURE: 70 MMHG | TEMPERATURE: 99.4 F | SYSTOLIC BLOOD PRESSURE: 102 MMHG | WEIGHT: 145 LBS | HEART RATE: 83 BPM | OXYGEN SATURATION: 98 %

## 2021-07-27 DIAGNOSIS — J06.9 VIRAL URI WITH COUGH: ICD-10-CM

## 2021-07-27 PROCEDURE — U0003 INFECTIOUS AGENT DETECTION BY NUCLEIC ACID (DNA OR RNA); SEVERE ACUTE RESPIRATORY SYNDROME CORONAVIRUS 2 (SARS-COV-2) (CORONAVIRUS DISEASE [COVID-19]), AMPLIFIED PROBE TECHNIQUE, MAKING USE OF HIGH THROUGHPUT TECHNOLOGIES AS DESCRIBED BY CMS-2020-01-R: HCPCS

## 2021-07-27 PROCEDURE — U0005 INFEC AGEN DETEC AMPLI PROBE: HCPCS

## 2021-07-27 PROCEDURE — 99214 OFFICE O/P EST MOD 30 MIN: CPT | Performed by: FAMILY MEDICINE

## 2021-07-27 RX ORDER — FEXOFENADINE HCL AND PSEUDOEPHEDRINE HCI 60; 120 MG/1; MG/1
1 TABLET, EXTENDED RELEASE ORAL 2 TIMES DAILY
Qty: 14 TABLET | Refills: 0 | Status: SHIPPED | OUTPATIENT
Start: 2021-07-27 | End: 2021-08-03

## 2021-07-27 RX ORDER — FLUTICASONE PROPIONATE 50 MCG
1 SPRAY, SUSPENSION (ML) NASAL 2 TIMES DAILY
Qty: 16 G | Refills: 0 | Status: SHIPPED | OUTPATIENT
Start: 2021-07-27 | End: 2021-08-03

## 2021-07-28 LAB
COVID ORDER STATUS COVID19: NORMAL
SARS-COV-2 RNA RESP QL NAA+PROBE: NOTDETECTED
SPECIMEN SOURCE: NORMAL

## 2021-07-28 NOTE — PROGRESS NOTES
CC:  cough        Cough  This is a new problem. The current episode started 2 days ago. The problem has been unchanged. The problem occurs constantly. The cough is dry.     She denies:  fatigue, muscle aches.   + subj fever. Pertinent negatives include no   headaches, nausea, vomiting, diarrhea, sweats, weight loss or wheezing. Nothing aggravates the symptoms.  Patient has tried nothing for the symptoms. There is no history of asthma.        Past Medical History:   Diagnosis Date   • Headache(784.0)          Social History     Tobacco Use   • Smoking status: Never Smoker   • Smokeless tobacco: Never Used   Substance Use Topics   • Alcohol use: No   • Drug use: Yes     Types: Marijuana     Comment: Pt states she is a medical marijuana pt but has not smoked since pregnant.          Current Outpatient Medications on File Prior to Visit   Medication Sig Dispense Refill   • ibuprofen (MOTRIN) 600 MG Tab Take 1 Tab by mouth every 6 hours as needed (For cramping after delivery; do not give if patient is receiving ketorolac (Toradol)). (Patient not taking: Reported on 11/16/2019) 60 Tab 0   • ferrous sulfate 325 (65 Fe) MG tablet Take 1 Tab by mouth 2 times a day, with meals. (Patient not taking: Reported on 11/16/2019) 60 Tab 0   • docusate sodium 100 MG Cap Take 100 mg by mouth 2 times a day as needed for Constipation. (Patient not taking: Reported on 11/16/2019) 60 Cap 0   • Prenatal MV-Min-Fe Fum-FA-DHA (PRENATAL 1 PO) Take  by mouth. (Patient not taking: Reported on 7/27/2021)       No current facility-administered medications on file prior to visit.                    Review of Systems   Constitutional: Negative for  weight loss.   HENT: negative for otalgia  Cardiovascular - denies chest pain or dyspnea  Respiratory: Positive for cough.  .  Negative for wheezing.    Neurological: Negative for headaches.   GI - denies nausea, vomiting or diarrhea  Neuro - denies numbness or tingling.            Objective:     BP  "102/70 (BP Location: Left arm, Patient Position: Sitting, BP Cuff Size: Adult)   Pulse 83   Temp 37.4 °C (99.4 °F) (Temporal)   Resp 16   Ht 1.676 m (5' 6\")   Wt 65.8 kg (145 lb)   SpO2 98%     Physical Exam   Constitutional: patient is oriented to person, place, and time. Patient appears well-developed and well-nourished. No distress.   HENT:   Head: Normocephalic and atraumatic.   Right Ear: External ear normal.   Left Ear: External ear normal.   Nose: Mucosal edema  present. Right sinus exhibits no maxillary sinus tenderness. Left sinus exhibits no maxillary sinus tenderness.   Mouth/Throat: Mucous membranes are normal. No oral lesions.  No posterior pharyngeal erythema.  No oropharyngeal exudate or posterior oropharyngeal edema.   Eyes: Conjunctivae and EOM are normal. Pupils are equal, round, and reactive to light. Right eye exhibits no discharge. Left eye exhibits no discharge. No scleral icterus.   Neck: Normal range of motion. Neck supple. No tracheal deviation present.   Cardiovascular: Normal rate, regular rhythm and normal heart sounds.  Exam reveals no friction rub.    Pulmonary/Chest: Effort normal. No respiratory distress. Patient has no wheezes or rhonchi. Patient has no rales.    Musculoskeletal:  exhibits no edema.   Lymphadenopathy:     Patient has no cervical adenopathy.      Neurological: patient is alert and oriented to person, place, and time.   Skin: Skin is warm and dry. No rash noted. No erythema.   Psychiatric: patient  has a normal mood and affect.  behavior is normal.   Nursing note and vitals reviewed.              Assessment/Plan:       1. Viral URI with cough   Rapid strep,  negative.   Will send screen for COVID  Home isolation per CDC guidelines      - fexofenadine-pseudoephedrine (ALLEGRA-D)  MG per tablet; Take 1 tablet by mouth 2 times a day for 7 days.  Dispense: 14 tablet; Refill: 0  - fluticasone (FLONASE) 50 MCG/ACT nasal spray; Administer 1 Spray into affected " nostril(S) 2 times a day for 7 days.  Dispense: 16 g; Refill: 0     Follow up in one week if no improvement, sooner if symptoms worsen.

## 2022-07-07 ENCOUNTER — HOSPITAL ENCOUNTER (OUTPATIENT)
Facility: MEDICAL CENTER | Age: 27
End: 2022-07-07
Attending: PREVENTIVE MEDICINE
Payer: COMMERCIAL

## 2022-07-07 ENCOUNTER — EH NON-PROVIDER (OUTPATIENT)
Dept: OCCUPATIONAL MEDICINE | Facility: CLINIC | Age: 27
End: 2022-07-07
Payer: MEDICAID

## 2022-07-07 ENCOUNTER — EMPLOYEE HEALTH (OUTPATIENT)
Dept: OCCUPATIONAL MEDICINE | Facility: CLINIC | Age: 27
End: 2022-07-07
Payer: MEDICAID

## 2022-07-07 VITALS
DIASTOLIC BLOOD PRESSURE: 64 MMHG | BODY MASS INDEX: 21.03 KG/M2 | HEIGHT: 67 IN | RESPIRATION RATE: 18 BRPM | SYSTOLIC BLOOD PRESSURE: 100 MMHG | WEIGHT: 134 LBS | HEART RATE: 78 BPM

## 2022-07-07 DIAGNOSIS — Z02.89 ENCOUNTER FOR OCCUPATIONAL HEALTH ASSESSMENT: ICD-10-CM

## 2022-07-07 DIAGNOSIS — Z02.1 PRE-EMPLOYMENT DRUG SCREENING: ICD-10-CM

## 2022-07-07 LAB
AMP AMPHETAMINE: NORMAL
BAR BARBITURATES: NORMAL
BZO BENZODIAZEPINES: NORMAL
COC COCAINE: NORMAL
INT CON NEG: NORMAL
INT CON POS: NORMAL
MDMA ECSTASY: NORMAL
MET METHAMPHETAMINES: NORMAL
MTD METHADONE: NORMAL
OPI OPIATES: NORMAL
OXY OXYCODONE: NORMAL
PCP PHENCYCLIDINE: NORMAL
POC URINE DRUG SCREEN OCDRS: NORMAL
THC: NORMAL

## 2022-07-07 PROCEDURE — 86480 TB TEST CELL IMMUN MEASURE: CPT | Performed by: PREVENTIVE MEDICINE

## 2022-07-07 PROCEDURE — 8915 PR COMPREHENSIVE PHYSICAL: Performed by: PREVENTIVE MEDICINE

## 2022-07-07 PROCEDURE — 80305 DRUG TEST PRSMV DIR OPT OBS: CPT | Performed by: PREVENTIVE MEDICINE

## 2022-07-08 LAB
GAMMA INTERFERON BACKGROUND BLD IA-ACNC: 0.04 IU/ML
M TB IFN-G BLD-IMP: NEGATIVE
M TB IFN-G CD4+ BCKGRND COR BLD-ACNC: 0 IU/ML
MITOGEN IGNF BCKGRD COR BLD-ACNC: >10 IU/ML
QFT TB2 - NIL TBQ2: 0 IU/ML

## 2022-07-20 ENCOUNTER — APPOINTMENT (OUTPATIENT)
Dept: OCCUPATIONAL MEDICINE | Facility: CLINIC | Age: 27
End: 2022-07-20
Payer: MEDICAID

## 2022-07-25 ENCOUNTER — RESEARCH ENCOUNTER (OUTPATIENT)
Dept: RESEARCH | Facility: WORKSITE | Age: 27
End: 2022-07-25
Payer: MEDICAID

## 2022-07-25 DIAGNOSIS — Z00.6 RESEARCH STUDY PATIENT: ICD-10-CM

## 2022-08-10 LAB
APOB+LDLR+PCSK9 GENE MUT ANL BLD/T: NOT DETECTED
BRCA1+BRCA2 DEL+DUP + FULL MUT ANL BLD/T: NOT DETECTED
MLH1+MSH2+MSH6+PMS2 GN DEL+DUP+FUL M: NOT DETECTED

## 2022-08-26 ENCOUNTER — EH NON-PROVIDER (OUTPATIENT)
Dept: OCCUPATIONAL MEDICINE | Facility: CLINIC | Age: 27
End: 2022-08-26
Payer: MEDICAID

## 2023-02-07 ENCOUNTER — GYNECOLOGY VISIT (OUTPATIENT)
Dept: OBGYN | Facility: CLINIC | Age: 28
End: 2023-02-07

## 2023-02-07 VITALS — BODY MASS INDEX: 22.74 KG/M2 | DIASTOLIC BLOOD PRESSURE: 70 MMHG | SYSTOLIC BLOOD PRESSURE: 120 MMHG | WEIGHT: 143 LBS

## 2023-02-07 DIAGNOSIS — N91.2 AMENORRHEA: ICD-10-CM

## 2023-02-07 PROCEDURE — 99999 PR NO CHARGE: CPT | Performed by: OBSTETRICS & GYNECOLOGY

## 2023-02-07 ASSESSMENT — ENCOUNTER SYMPTOMS
PSYCHIATRIC NEGATIVE: 1
RESPIRATORY NEGATIVE: 1
CARDIOVASCULAR NEGATIVE: 1
CONSTITUTIONAL NEGATIVE: 1
EYES NEGATIVE: 1
GASTROINTESTINAL NEGATIVE: 1
MUSCULOSKELETAL NEGATIVE: 1
NEUROLOGICAL NEGATIVE: 1

## 2023-02-07 NOTE — PROGRESS NOTES
GYN PROBLEM VISIT    CC: Amenorrhea    HPI: Patient is a 28 y.o.  who complains of amenorrhea and a positive pregnancy test. Her LMP was 2023 which would make her 4w5d today. She has no concerns like vaginal bleeding or cramping or pelvic pain. No nausea or vomiting yet. DIscussed with her that it's probably too early to see something and we decided to forgo the scan today and postpone for one month. Miscarriage and ectopic precautions were given.      ROS:   Review of Systems   Constitutional: Negative.    HENT: Negative.     Eyes: Negative.    Respiratory: Negative.     Cardiovascular: Negative.    Gastrointestinal: Negative.    Genitourinary: Negative.    Musculoskeletal: Negative.    Skin: Negative.    Neurological: Negative.    Endo/Heme/Allergies: Negative.    Psychiatric/Behavioral: Negative.         PFSH:  I personally reviewed the past medical and surgical histories.     Social History     Tobacco Use    Smoking status: Never    Smokeless tobacco: Never   Vaping Use    Vaping Use: Never used   Substance Use Topics    Alcohol use: No    Drug use: Not Currently     Types: Marijuana     Comment: Pt states she is a medical marijuana pt but has not smoked since pregnant.        Social History     Substance and Sexual Activity   Sexual Activity Yes    Partners: Male    Comment: BCP for PP        ALLERGIES / REACTIONS:  No Known Allergies                        PHYSICAL EXAMINATION:  Vital Signs:   Vitals:    23 1516   BP: 120/70   BP Location: Left arm   Patient Position: Sitting   Weight: 143 lb     Body mass index is 22.74 kg/m².    Physical Exam  Vitals reviewed.   Constitutional:       Appearance: Normal appearance.   HENT:      Head: Normocephalic.   Eyes:      Extraocular Movements: Extraocular movements intact.      Pupils: Pupils are equal, round, and reactive to light.   Cardiovascular:      Rate and Rhythm: Normal rate.   Abdominal:      General: Abdomen is flat.      Palpations:  Abdomen is soft.   Musculoskeletal:         General: Normal range of motion.      Cervical back: Normal range of motion.   Neurological:      General: No focal deficit present.      Mental Status: She is alert.   Psychiatric:         Mood and Affect: Mood normal.         Behavior: Behavior normal.        ASSESSMENT AND PLAN:  28 y.o.  who by LMP is 4w5d pregnant today    1. Amenorrhea    Other orders  - Prenatal MV-Min-Fe Fum-FA-DHA (PRENATAL 1 PO); Take  by mouth.    Plan:  -Discussed that it is too early to see something by ultrasound  -Miscarriage and ectopic precautions given  - RTC in one month   - Requested that patient not have pay for today's visit as it is a scheduling error, will see her in one month for confirmation of pregnancy     Briana Mauro M.D.  Obstetrics & Gynecology   [phone]  3:28 PM

## 2023-03-10 ENCOUNTER — GYNECOLOGY VISIT (OUTPATIENT)
Dept: OBGYN | Facility: CLINIC | Age: 28
End: 2023-03-10

## 2023-03-10 VITALS — WEIGHT: 143 LBS | BODY MASS INDEX: 22.74 KG/M2

## 2023-03-10 DIAGNOSIS — N91.2 AMENORRHEA: ICD-10-CM

## 2023-03-10 PROCEDURE — 99202 OFFICE O/P NEW SF 15 MIN: CPT | Performed by: OBSTETRICS & GYNECOLOGY

## 2023-03-10 ASSESSMENT — ENCOUNTER SYMPTOMS
EYES NEGATIVE: 1
CARDIOVASCULAR NEGATIVE: 1
NEUROLOGICAL NEGATIVE: 1
CONSTITUTIONAL NEGATIVE: 1
MUSCULOSKELETAL NEGATIVE: 1
PSYCHIATRIC NEGATIVE: 1
GASTROINTESTINAL NEGATIVE: 1
RESPIRATORY NEGATIVE: 1

## 2023-03-10 NOTE — PROGRESS NOTES
NP here for DUB  Phone # [phone] (home)   C/o n/v   Abrasion COPD exacerbation Closed head injury, initial encounter

## 2023-03-10 NOTE — PROGRESS NOTES
GYN PROBLEM VISIT    CC:  Gynecologic Exam (DUB)     HPI: Patient is a 28 y.o.  who presents with amenorrhea. She is unsure of her last period, states it was early in January, but she isn't certain. She reports that her nausea has increased, but she overall tolerating well. She has no bleeding, cramping, or spotting.      ROS:   Review of Systems   Constitutional: Negative.    HENT: Negative.     Eyes: Negative.    Respiratory: Negative.     Cardiovascular: Negative.    Gastrointestinal: Negative.    Genitourinary: Negative.    Musculoskeletal: Negative.    Skin: Negative.    Neurological: Negative.    Endo/Heme/Allergies: Negative.    Psychiatric/Behavioral: Negative.         PFSH:  I personally reviewed the past medical and surgical histories.     Social History     Tobacco Use    Smoking status: Never    Smokeless tobacco: Never   Vaping Use    Vaping Use: Never used   Substance Use Topics    Alcohol use: No    Drug use: Not Currently     Types: Marijuana     Comment: Pt states she is a medical marijuana pt but has not smoked since pregnant.        Social History     Substance and Sexual Activity   Sexual Activity Yes    Partners: Male    Comment: BCP for PP        ALLERGIES / REACTIONS:  No Known Allergies                        PHYSICAL EXAMINATION:  Vital Signs:   Vitals:    03/10/23 1450   Weight: 143 lb     Body mass index is 22.74 kg/m².    Physical Exam  Vitals reviewed.   Constitutional:       Appearance: Normal appearance.   HENT:      Head: Normocephalic and atraumatic.      Nose: Nose normal.   Eyes:      Extraocular Movements: Extraocular movements intact.      Pupils: Pupils are equal, round, and reactive to light.   Cardiovascular:      Rate and Rhythm: Normal rate.   Pulmonary:      Effort: Pulmonary effort is normal.   Abdominal:      General: Abdomen is flat.      Palpations: Abdomen is soft.   Genitourinary:     Comments: Transvaginal ultrasound performed today due to amenorrhea. A single  live intrauterine pregnancy was identified. Crown rump length measures 7w6d. Fetal heart tones were 164  Musculoskeletal:      Cervical back: Normal range of motion.   Neurological:      Mental Status: She is alert.        ASSESSMENT AND PLAN:  28 y.o.  who presents due to amenorrhea and positive home pregnancy test   - Ultrasound performed today shows the patient to be 7w6d with an EDC of 10/21/2023. The patient is uncertain of exact date of period, advised we would use this for dating     1. Amenorrhea    Patient is self pay, so labs will be deferred until next visit as part of the pregnancy package.   Continue prenatal vitamin  Bleeding and miscarriage precautions reviewed  Return to clinic in 4 weeks for initial prenatal visit. Per chart review she will need a pap smear     Briana Mauro M.D.  Obstetrics & Gynecology   [phone]  3:02 PM

## 2023-03-14 ENCOUNTER — APPOINTMENT (OUTPATIENT)
Dept: OBGYN | Facility: CLINIC | Age: 28
End: 2023-03-14
Payer: MEDICAID

## 2023-03-15 ENCOUNTER — APPOINTMENT (OUTPATIENT)
Dept: OBGYN | Facility: CLINIC | Age: 28
End: 2023-03-15
Payer: MEDICAID

## 2023-03-28 ENCOUNTER — HOSPITAL ENCOUNTER (OUTPATIENT)
Dept: LAB | Facility: MEDICAL CENTER | Age: 28
End: 2023-03-28
Payer: MEDICAID

## 2023-03-28 ENCOUNTER — HOSPITAL ENCOUNTER (OUTPATIENT)
Facility: MEDICAL CENTER | Age: 28
End: 2023-03-28
Payer: MEDICAID

## 2023-03-28 ENCOUNTER — INITIAL PRENATAL (OUTPATIENT)
Dept: OBGYN | Facility: CLINIC | Age: 28
End: 2023-03-28
Payer: MEDICAID

## 2023-03-28 VITALS — SYSTOLIC BLOOD PRESSURE: 106 MMHG | DIASTOLIC BLOOD PRESSURE: 58 MMHG | WEIGHT: 140 LBS | BODY MASS INDEX: 22.26 KG/M2

## 2023-03-28 DIAGNOSIS — Z34.81 ENCOUNTER FOR SUPERVISION OF OTHER NORMAL PREGNANCY, FIRST TRIMESTER: ICD-10-CM

## 2023-03-28 LAB
ABO GROUP BLD: NORMAL
BLD GP AB SCN SERPL QL: NORMAL
RH BLD: NORMAL

## 2023-03-28 PROCEDURE — 87340 HEPATITIS B SURFACE AG IA: CPT

## 2023-03-28 PROCEDURE — 86850 RBC ANTIBODY SCREEN: CPT

## 2023-03-28 PROCEDURE — 0500F INITIAL PRENATAL CARE VISIT: CPT

## 2023-03-28 PROCEDURE — 85027 COMPLETE CBC AUTOMATED: CPT

## 2023-03-28 PROCEDURE — 80307 DRUG TEST PRSMV CHEM ANLYZR: CPT

## 2023-03-28 PROCEDURE — 87086 URINE CULTURE/COLONY COUNT: CPT

## 2023-03-28 PROCEDURE — 36415 COLL VENOUS BLD VENIPUNCTURE: CPT

## 2023-03-28 PROCEDURE — 86900 BLOOD TYPING SEROLOGIC ABO: CPT

## 2023-03-28 PROCEDURE — 86762 RUBELLA ANTIBODY: CPT

## 2023-03-28 PROCEDURE — 86780 TREPONEMA PALLIDUM: CPT

## 2023-03-28 PROCEDURE — 87591 N.GONORRHOEAE DNA AMP PROB: CPT

## 2023-03-28 PROCEDURE — 87389 HIV-1 AG W/HIV-1&-2 AB AG IA: CPT

## 2023-03-28 PROCEDURE — 86803 HEPATITIS C AB TEST: CPT

## 2023-03-28 PROCEDURE — 87491 CHLMYD TRACH DNA AMP PROBE: CPT

## 2023-03-28 PROCEDURE — 86901 BLOOD TYPING SEROLOGIC RH(D): CPT

## 2023-03-28 PROCEDURE — 88175 CYTOPATH C/V AUTO FLUID REDO: CPT

## 2023-03-28 ASSESSMENT — EDINBURGH POSTNATAL DEPRESSION SCALE (EPDS)
I HAVE BEEN ANXIOUS OR WORRIED FOR NO GOOD REASON: HARDLY EVER
THINGS HAVE BEEN GETTING ON TOP OF ME: NO, MOST OF THE TIME I HAVE COPED QUITE WELL
I HAVE LOOKED FORWARD WITH ENJOYMENT TO THINGS: AS MUCH AS I EVER DID
I HAVE BEEN SO UNHAPPY THAT I HAVE HAD DIFFICULTY SLEEPING: NOT VERY OFTEN
I HAVE FELT SCARED OR PANICKY FOR NO GOOD REASON: NO, NOT MUCH
TOTAL SCORE: 6
I HAVE BLAMED MYSELF UNNECESSARILY WHEN THINGS WENT WRONG: NOT VERY OFTEN
THE THOUGHT OF HARMING MYSELF HAS OCCURRED TO ME: NEVER
I HAVE BEEN ABLE TO LAUGH AND SEE THE FUNNY SIDE OF THINGS: AS MUCH AS I ALWAYS COULD
I HAVE FELT SAD OR MISERABLE: NOT VERY OFTEN
I HAVE BEEN SO UNHAPPY THAT I HAVE BEEN CRYING: NO, NEVER

## 2023-03-28 NOTE — PROGRESS NOTES
CC: New Ob visit     Subjective Cristin Desai  10w3d by 7w6d US (inconsistent LMP) presents for prenatal care. Today is her first prenatal visit at [facility].   I have reviewed the patients' medical, surgical, gynecological, obstetrical, social, and family histories, medications and available lab results and pertinent notes are as follows:   No ER visits this pregnancy  No previous care in this pregnancy.   She has complaints of nausea, fatigue, and breast tenderness.    Genetic screening options: desires AFP.  Declines carrier screening. Desires NIPTs.    Reports no FM. Denies VB, LOF, or cramping.  Denies dysuria, vaginal DC.    Pt is  and lives with FOB and older child. FOB is involved and supportive, same as first pregnancy. She is currently working as [employer], no heavy lifting, no chemical exposure.    Pregnancy is planned and welcome.      OB History    Para Term  AB Living   2 1 1 0 0 1   SAB IAB Ectopic Molar Multiple Live Births   0 0 0 0 0 1       Past Gynecological History:  Denies fibroids, ovarian cysts, abnormal pap smears, STDs. She denies ever having surgery on her cervix, uterus, or ovaries in the past.    Last pap smear was 5 years ago, denies hx of abnormal paps  Past OB hx includes 1 prior  in 2018, uncomplicated.   History of HSV I or II in self or partner: no  History of STIs in self or partner: no  History of Thyroid problems: no    History of depression or anxiety no  EPDS - 6 today       Past Medical History:   Diagnosis Date    Headache(784.0)      History reviewed. No pertinent surgical history.   Social History     Socioeconomic History    Marital status:      Spouse name: Not on file    Number of children: Not on file    Years of education: Not on file    Highest education level: Not on file   Occupational History    Not on file   Tobacco Use    Smoking status: Never    Smokeless tobacco: Never   Vaping Use    Vaping Use: Never  used   Substance and Sexual Activity    Alcohol use: No    Drug use: Not Currently     Types: Marijuana     Comment: last used marijuana 01/28/2023    Sexual activity: Yes     Partners: Male     Comment: . planned pregnancy   Other Topics Concern    Not on file   Social History Narrative    Not on file     Social Determinants of Health     Financial Resource Strain: Not on file   Food Insecurity: Not on file   Transportation Needs: Not on file   Physical Activity: Not on file   Stress: Not on file   Social Connections: Not on file   Intimate Partner Violence: Not on file   Housing Stability: Not on file     Family History:   Family History   Problem Relation Age of Onset    GI Disease Mother         hep c    No Known Problems Father     No Known Problems Brother     No Known Problems Brother        Genetic Screening/Teratology Counseling- Includes patient, baby's father, or anyone in either family with:  Patient's age 35 years or older as of estimated date of delivery: No     Thalassemia (Italian, Greek, Mediterranean, or  background): MCV less than 80: No     Neural tube defect (Meningomyelocele, Spina bifida, or Anencephaly): No     Congenital heart defect: No     Down syndrome: No     Júnior-Sachs (Ashkenazi Adventist, Cajun, Lithuanian [place]): No     Canavan disease (Ashkenazi Adventist): No     Familial dysautonomia (Ashkenazi Adventist): No     Sickle cell disease or trait (): No     Hemophilia or other blood disorders: No     Muscular dystrophy: No    Cystic fibrosis: No     Denisha's chorea: No     Mental retardation/autism: No     Other inherited genetic or chromosomal disorder: No     Maternal metabolic disorder (eg. Type 1 diabetes, PKU): No     Patient or baby's father had child with birth defects not listed above: No     Recurrent pregnancy loss, or a stillbirth: No     Medications (including supplements, vitamins, herbs, or OTC drugs)/illicit/recreational drugs/alcohol since last menstrual  period: No             Infection Prevention  1. High Risk For HIV: No 6. Rash Or Illness Since LMP: No     2. High Risk For Hepatitis B or C: No 7. History Of STD, GC, Chlamydia, HPV Syphilis: No     3. Live With Someone With TB Or Exposed To TB: No 8. Have a cat in the home?: Yes     4. Patient Or Partner Has A History Of Herpes: No 8a. Responsible for changing the litter?: No     5. History of Chicken Pox: No    Comments: Planned pregnancy   FOB involved and supportive.  and living together. Same father as the other child.  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            Objective:   Allergies: Patient has no known allergies.  Objective:/58   Wt 140 lb      Prenatal Physical:  General Exam:  HEENT: normal    Heart: normal    Thyroid: normal    Lungs: normal    Lymph Nodes: normal    Neurological: normal    Abdomen: normal    Skin: normal    Extremities: normal    Pelvic Exam:  Vulva:  Normal  Vagina:  Normal  Cervix:  Normal  Uterus (wks):  10  Adnexa:  Normal     Lab: No results found for this or any previous visit (from the past 672 hour(s)).    Ultrasound:  Reviewed initial scan and NAKITA is by 7w6d scan, inconsistent LMP    Assessment:  --Normal Exam at 10w3d  --Size consistent with dates  --FHR positive  Encounter Diagnosis   Name Primary?    Encounter for supervision of other normal pregnancy, first trimester         Plan:  Reviewed the patients risk factors for this pregnancy and recommend the need for   Complete OB US in: 9-10 weeks  Additional labs/imaging: routine at this time  Antepartum fetal monitoring requirements: routine at this time  2. Genetic screening was discussed with literature on Prenatal Screening, Cystic Fibrosis, and SMA provided and the patient plans to:  - accepted MSAFP - to be ordered next visit  - declined carrier testing  - accepted NIPTs  3. Discuss importance of adequate water intake, taking PNV, healthy nutritional choices, and avoidence of ETOH/Tobacco/drugs  4.  Discuss importance of exercise, as well as rest   5. If has nausea, take Vitamin B6 25mg TID with doxylamine/Unisom 25mg at night  6. Prenatal labs and UDS ordered - lab slip given  7. Discussed OB care at [facility] including midwifery care, group practice, and call schedules  8. GC/CT collected today via pap.    9. Pap collected today.  10. Pregnancy guide provided and reviewed safe medications in pregnancy page  11. Follow up in  4 weeks for next visit and PRN      Liliane Haji C.N.M.

## 2023-03-28 NOTE — PROGRESS NOTES
NOB visit   LMP: ~ 01/05/2023 with NAKITA of 01/05/2023  Pt had US on 03/10/2023 with EDC of 10/21/2023  WT: 140.0 lb  BP: 106/58  Pt states having the morning sickness off and on and heartburn mainly at night.    Last pap: 5 yrs ago Negative per pt.  Pt desires NIPT testing   Good # [phone]    EPDS Score: 6

## 2023-03-29 LAB
C TRACH DNA GENITAL QL NAA+PROBE: NEGATIVE
CYTOLOGY REG CYTOL: NORMAL
ERYTHROCYTE [DISTWIDTH] IN BLOOD BY AUTOMATED COUNT: 41.1 FL (ref 35.9–50)
HBV SURFACE AG SER QL: ABNORMAL
HCT VFR BLD AUTO: 41.8 % (ref 37–47)
HCV AB SER QL: ABNORMAL
HGB BLD-MCNC: 14.1 G/DL (ref 12–16)
HIV 1+2 AB+HIV1 P24 AG SERPL QL IA: NORMAL
MCH RBC QN AUTO: 29.7 PG (ref 27–33)
MCHC RBC AUTO-ENTMCNC: 33.7 G/DL (ref 33.6–35)
MCV RBC AUTO: 88.2 FL (ref 81.4–97.8)
N GONORRHOEA DNA GENITAL QL NAA+PROBE: NEGATIVE
PLATELET # BLD AUTO: 279 K/UL (ref 164–446)
PMV BLD AUTO: 11.3 FL (ref 9–12.9)
RBC # BLD AUTO: 4.74 M/UL (ref 4.2–5.4)
RUBV AB SER QL: 30.1 IU/ML
SPECIMEN SOURCE: NORMAL
T PALLIDUM AB SER QL IA: ABNORMAL
WBC # BLD AUTO: 12.8 K/UL (ref 4.8–10.8)

## 2023-03-30 LAB
AMPHET CTO UR CFM-MCNC: NEGATIVE NG/ML
BACTERIA UR CULT: NORMAL
BARBITURATES CTO UR CFM-MCNC: NEGATIVE NG/ML
BENZODIAZ CTO UR CFM-MCNC: NEGATIVE NG/ML
CANNABINOIDS CTO UR CFM-MCNC: POSITIVE NG/ML
COCAINE CTO UR CFM-MCNC: NEGATIVE NG/ML
DRUG COMMENT 753798: NORMAL
METHADONE CTO UR CFM-MCNC: NEGATIVE NG/ML
OPIATES CTO UR CFM-MCNC: NEGATIVE NG/ML
PCP CTO UR CFM-MCNC: NEGATIVE NG/ML
PROPOXYPH CTO UR CFM-MCNC: NEGATIVE NG/ML
SIGNIFICANT IND 70042: NORMAL
SITE SITE: NORMAL
SOURCE SOURCE: NORMAL

## 2023-03-31 PROBLEM — O26.899 RH NEGATIVE STATE IN ANTEPARTUM PERIOD: Status: ACTIVE | Noted: 2017-09-22

## 2023-03-31 PROBLEM — O99.320 MARIJUANA USE DURING PREGNANCY: Status: ACTIVE | Noted: 2023-03-31

## 2023-03-31 PROBLEM — F12.90 MARIJUANA USE DURING PREGNANCY: Status: ACTIVE | Noted: 2023-03-31

## 2023-04-01 LAB — THC UR CFM-MCNC: >500 NG/ML

## 2023-04-10 DIAGNOSIS — Z34.81 ENCOUNTER FOR SUPERVISION OF OTHER NORMAL PREGNANCY, FIRST TRIMESTER: ICD-10-CM

## 2023-04-12 ENCOUNTER — TELEPHONE (OUTPATIENT)
Dept: OBGYN | Facility: CLINIC | Age: 28
End: 2023-04-12

## 2023-04-12 NOTE — TELEPHONE ENCOUNTER
Pt called and states she is experiencing nausea and vomiting only in the morning. When she vomits there is some blood. I advise pt to take B6 and Unisom at night to see if that helps. She would like to see if she should be in sooner. Please send back to your MA

## 2023-04-26 ENCOUNTER — ROUTINE PRENATAL (OUTPATIENT)
Dept: OBGYN | Facility: CLINIC | Age: 28
End: 2023-04-26
Payer: MEDICAID

## 2023-04-26 VITALS — BODY MASS INDEX: 22.86 KG/M2 | SYSTOLIC BLOOD PRESSURE: 112 MMHG | DIASTOLIC BLOOD PRESSURE: 50 MMHG | WEIGHT: 143.8 LBS

## 2023-04-26 DIAGNOSIS — O99.320 MARIJUANA USE DURING PREGNANCY: ICD-10-CM

## 2023-04-26 DIAGNOSIS — Z34.82 ENCOUNTER FOR SUPERVISION OF OTHER NORMAL PREGNANCY, SECOND TRIMESTER: ICD-10-CM

## 2023-04-26 DIAGNOSIS — F12.90 MARIJUANA USE DURING PREGNANCY: ICD-10-CM

## 2023-04-26 PROCEDURE — 0502F SUBSEQUENT PRENATAL CARE: CPT

## 2023-04-26 NOTE — PROGRESS NOTES
Pt here today for OB follow up  Reports light flutters   WT: 143.8 lb   BP: 112/50  Preferred pharmacy verified with pt.  MFM Appointment: not at this time   Pt states no complaints or concerns today  US scheduled for 06/06/23  Good # [phone]

## 2023-04-26 NOTE — PROGRESS NOTES
S: Champagne here for routine OB follow up.  Reports some early FM.  Denies VB, LOF,  RUCs or vaginal DC. No concerns or questions today.     O:    Vitals:    04/26/23 1306   BP: 112/50   Weight: 143 lb 12.8 oz   See flow sheet.    A: IUP 14w4d  S>D but appropriate for multip  Patient Active Problem List    Diagnosis Date Noted    Marijuana use during pregnancy 03/31/2023    Prenatal care, subsequent pregnancy, second trimester 03/28/2023    Rh negative state in antepartum period 09/22/2017       P:  1.  Reviewed labs w pt., Normal NIPT        2.  ordered AFP for after 15 wks        3.  US is scheduled for June 6, patient aware.        4.  Questions answered. Anticipatory guidance.         5.  Encouraged adequate water intake.        6.  F/u 4 wks and PRN  Orders Placed This Encounter    AFP MATERNAL SERUM ALPHA-FETOPROTEIN    Doxylamine Succinate, Sleep, (UNISOM PO)    Pyridoxine HCl (VITAMIN B-6 PO)     MADIE CarrionNSULTANA.

## 2023-05-18 ENCOUNTER — HOSPITAL ENCOUNTER (OUTPATIENT)
Dept: LAB | Facility: MEDICAL CENTER | Age: 28
End: 2023-05-18
Payer: MEDICAID

## 2023-05-18 ENCOUNTER — ROUTINE PRENATAL (OUTPATIENT)
Dept: OBGYN | Facility: CLINIC | Age: 28
End: 2023-05-18
Payer: MEDICAID

## 2023-05-18 VITALS — WEIGHT: 146.8 LBS | BODY MASS INDEX: 23.34 KG/M2 | DIASTOLIC BLOOD PRESSURE: 60 MMHG | SYSTOLIC BLOOD PRESSURE: 114 MMHG

## 2023-05-18 DIAGNOSIS — Z34.82 PRENATAL CARE, SUBSEQUENT PREGNANCY, SECOND TRIMESTER: ICD-10-CM

## 2023-05-18 DIAGNOSIS — Z34.82 ENCOUNTER FOR SUPERVISION OF OTHER NORMAL PREGNANCY, SECOND TRIMESTER: ICD-10-CM

## 2023-05-18 PROCEDURE — 0502F SUBSEQUENT PRENATAL CARE: CPT

## 2023-05-18 PROCEDURE — 3078F DIAST BP <80 MM HG: CPT

## 2023-05-18 PROCEDURE — 36415 COLL VENOUS BLD VENIPUNCTURE: CPT

## 2023-05-18 PROCEDURE — 3074F SYST BP LT 130 MM HG: CPT

## 2023-05-18 PROCEDURE — 82105 ALPHA-FETOPROTEIN SERUM: CPT

## 2023-05-18 NOTE — PROGRESS NOTES
"S: Champagne here for routine OB follow up.  Reports infrequent but + FM.  Denies VB, LOF,  RUCs or vaginal DC. Has more cramping in the evenings, does not feel she is drinking much water and she is very active. Nothing too painful per patient. Reports a scant amount of colostrum that is sometimes a \"milky black\" color for a few drips while in the shower, this occurs most times she showers. She will take a picture and bring it in next visit.     O:    Vitals:    05/18/23 0853   BP: 114/60   Weight: 146 lb 12.8 oz   See flow sheet.  Breast exam benign, unable to illicit discharge from the right breast, left breast with a few drips of clear to white colostrum.     A: IUP 17w5d  S=D  Patient Active Problem List    Diagnosis Date Noted    Marijuana use during pregnancy 03/31/2023    Prenatal care, subsequent pregnancy, second trimester 03/28/2023    Rh negative state in antepartum period 09/22/2017       P:  1.  Reviewed labs w pt.        2.  Pending AFP.        3.  US is scheduled for 6/6, aware she can't have her 6yo daughter so she will arrange childcare.        4.  Questions answered. Anticipatory guidance.         5.  Encouraged adequate water intake.        6.   Discussed when cramping is worrisome, when to RTC        7.  F/u 4 wks and PRN    MADIE CarrionNSULTANA.    "

## 2023-05-18 NOTE — PROGRESS NOTES
Pt here today for OB follow up  Reports +FM  WT: 146.8 lb  BP: 114/60  Preferred pharmacy verified with pt.  MFM Appointment: not at the time   Pt states has been having a little cramping. States no other complaints today  Good # [phone]   on 06/06/23

## 2023-05-21 LAB
# FETUSES US: NORMAL
AFP MOM SERPL: 1.16
AFP SERPL-MCNC: 52 NG/ML
AGE - REPORTED: 27.8 YR
CURRENT SMOKER: NO
FAMILY MEMBER DISEASES HX: NO
GA METHOD: NORMAL
GA: NORMAL WK
IDDM PATIENT QL: NO
INTEGRATED SCN PATIENT-IMP: NORMAL
SPECIMEN DRAWN SERPL: NORMAL

## 2023-05-26 ENCOUNTER — TELEPHONE (OUTPATIENT)
Dept: OBGYN | Facility: CLINIC | Age: 28
End: 2023-05-26
Payer: MEDICAID

## 2023-06-06 ENCOUNTER — APPOINTMENT (OUTPATIENT)
Dept: RADIOLOGY | Facility: IMAGING CENTER | Age: 28
End: 2023-06-06
Payer: MEDICAID

## 2023-06-06 DIAGNOSIS — Z34.81 ENCOUNTER FOR SUPERVISION OF OTHER NORMAL PREGNANCY, FIRST TRIMESTER: ICD-10-CM

## 2023-06-06 DIAGNOSIS — O36.62X0 EXCESSIVE FETAL GROWTH AFFECTING MANAGEMENT OF PREGNANCY IN SECOND TRIMESTER, SINGLE OR UNSPECIFIED FETUS: ICD-10-CM

## 2023-06-06 PROCEDURE — 76805 OB US >/= 14 WKS SNGL FETUS: CPT | Mod: TC

## 2023-06-06 PROCEDURE — 76817 TRANSVAGINAL US OBSTETRIC: CPT | Mod: TC

## 2023-06-07 ENCOUNTER — TELEPHONE (OUTPATIENT)
Dept: OBGYN | Facility: CLINIC | Age: 28
End: 2023-06-07
Payer: MEDICAID

## 2023-06-08 NOTE — TELEPHONE ENCOUNTER
----- Message from Liliane Haji C.N.M. sent at 6/6/2023  6:40 PM PDT -----  Please let patient know that the baby's growth is on the large side. I'd like to recheck growth in 8-10 weeks. I've ordered this if you can please connect her with the PARs to schedule. Thanks.       Did not call pt.   Pt saw [employer] message on 6/7/23 at 1131. Pt already schedule for f/u US on 8/16/23

## 2023-06-15 ENCOUNTER — ROUTINE PRENATAL (OUTPATIENT)
Dept: OBGYN | Facility: CLINIC | Age: 28
End: 2023-06-15
Payer: MEDICAID

## 2023-06-15 VITALS — SYSTOLIC BLOOD PRESSURE: 108 MMHG | WEIGHT: 152.2 LBS | BODY MASS INDEX: 24.2 KG/M2 | DIASTOLIC BLOOD PRESSURE: 60 MMHG

## 2023-06-15 DIAGNOSIS — Z34.82 PRENATAL CARE, SUBSEQUENT PREGNANCY, SECOND TRIMESTER: ICD-10-CM

## 2023-06-15 DIAGNOSIS — O36.62X0 EXCESSIVE FETAL GROWTH AFFECTING MANAGEMENT OF PREGNANCY IN SECOND TRIMESTER, SINGLE OR UNSPECIFIED FETUS: ICD-10-CM

## 2023-06-15 PROCEDURE — 3078F DIAST BP <80 MM HG: CPT

## 2023-06-15 PROCEDURE — 0502F SUBSEQUENT PRENATAL CARE: CPT

## 2023-06-15 PROCEDURE — 3074F SYST BP LT 130 MM HG: CPT

## 2023-06-15 NOTE — PROGRESS NOTES
S:  Champagne here with daughter for routine prenatal follow up.  Reports good FM.  Denies VB, LOF, RUCs, or vaginal DC.      O:    Vitals:    06/15/23 1356   BP: 108/60   Weight: 152 lb 3.2 oz       See flow sheet    Complete OB US     6/6/2023 1:30 PM     HISTORY/REASON FOR EXAM:  Evaluate fetal anatomy.     TECHNIQUE/EXAM DESCRIPTION: OB complete ultrasound.  Transabdominal and transvaginal scanning were performed.     COMPARISON:  None     FINDINGS:  Fetal Lie:  Breech  LMP:  1/5/2023  Clinical NAKITA by LMP:  10/21/2023     Placenta (Location):  Anterior  Placenta Previa: No  Placental ndGndrndanddndend:nd nd2nd Amniotic Fluid Volume:  SHARRON = 16.38 cm     Fetal Heart Rate:  164 bpm     Cervical Length:  3.90 cm transabdominal, transvaginal     No maternal adnexal mass is identified.     Umbilical Artery S/D Ratio(s):  Not applicable     Fetal Anatomy  (Seen or Not Seen)  Lateral Ventricles     Seen  Cisterna Magna        Seen  Cerebellum              Seen  CSP             Seen  Orbits             Seen  Face/Lips                Seen  Cord Insertion         Seen  Placental CI         Seen  4 Chamber Heart     Seen  LVOT               Seen  RVOT              Seen  3 Vessel View     Seen  Stomach       Seen  Kidneys                   Seen  Urinary Bladder      Seen  Spine                       Seen  3 Vessel Cord          Seen  Both Upper Extremities    Seen  Both Lower Extremities    Seen  Diaphragm             Seen  Movement       Seen  Gender:  Likely female     Fetal Biometry  BPD    4.89 cm, 20 weeks 6 days, (64.8%)  HC    19.06 cm, 21 weeks 2 days, (80.1%)  AC    17.48 cm, 22 weeks 3 days, (93.7%)  Femur Length    3.68 cm, 21 weeks 5 days, (83.0%)  Humerus Length    3.54 cm, 22 weeks 2 days, (95.5%)  Cerebellum Diameter   2.28 cm, 21 weeks 1 day, (90.2%)     EGA by this US:  21 weeks 4 days  NAKITA by this US: 10/13/2023  NAKITA by 1st US:  10/21/2023     Estimated Fetal Weight:  464 grams  EFW Percentile: 98.5%     Comments:      IMPRESSION:     Single intrauterine pregnancy of an estimated gestational age of 21 weeks 4 days with an estimated date of delivery of 10/13/2023.     Fetal survey within normal limits.    A:    IUP 21w5d  S=D by FH, LGA by US on 6/6/23  Patient Active Problem List    Diagnosis Date Noted    Excessive fetal growth affecting management of mother in second trimester, antepartum 06/06/2023    Marijuana use during pregnancy 03/31/2023    Prenatal care, subsequent pregnancy, second trimester 03/28/2023    Rh negative state in antepartum period 09/22/2017        P:  1.  Reviewed labs, ultrasound w pt.  Has follow up growth US scheduled for 8/16        2.  Questions answered.          3.  Encouraged adequate water intake        4.  Anticipatory guidance        5.  F/u 4 wks and PRN    MADIE CarroinN.M.

## 2023-06-15 NOTE — PROGRESS NOTES
Pt here today for OB follow up  Reports +FM  WT: 152.2 lb  BP: 108/60  Preferred pharmacy verified with pt.  MFM Appointment: not at this time  Pt states no complaints or concerns today  Good # [phone]

## 2023-07-11 ENCOUNTER — ROUTINE PRENATAL (OUTPATIENT)
Dept: OBGYN | Facility: CLINIC | Age: 28
End: 2023-07-11
Payer: MEDICAID

## 2023-07-11 VITALS — SYSTOLIC BLOOD PRESSURE: 100 MMHG | BODY MASS INDEX: 25.69 KG/M2 | DIASTOLIC BLOOD PRESSURE: 46 MMHG | WEIGHT: 161.6 LBS

## 2023-07-11 DIAGNOSIS — O26.899 RH NEGATIVE STATE IN ANTEPARTUM PERIOD: ICD-10-CM

## 2023-07-11 DIAGNOSIS — Z67.91 RH NEGATIVE STATE IN ANTEPARTUM PERIOD: ICD-10-CM

## 2023-07-11 DIAGNOSIS — Z34.82 ENCOUNTER FOR SUPERVISION OF OTHER NORMAL PREGNANCY, SECOND TRIMESTER: ICD-10-CM

## 2023-07-11 PROCEDURE — 0502F SUBSEQUENT PRENATAL CARE: CPT

## 2023-07-11 PROCEDURE — 3078F DIAST BP <80 MM HG: CPT

## 2023-07-11 PROCEDURE — 3074F SYST BP LT 130 MM HG: CPT

## 2023-07-11 RX ORDER — BREAST PUMP
1 EACH MISCELLANEOUS ONCE
Qty: 1 EACH | Refills: 0 | Status: SHIPPED | OUTPATIENT
Start: 2023-07-11 | End: 2023-07-11

## 2023-07-11 RX ORDER — BREAST PUMP
1 EACH MISCELLANEOUS ONCE
Qty: 1 EACH | Refills: 0 | Status: SHIPPED
Start: 2023-07-11 | End: 2023-07-11

## 2023-07-11 NOTE — PROGRESS NOTES
S:  Champagne here with older daughter for routine prenatal follow up.  Reports good FM.  Denies VB, RUCs, LOF or vaginal DC.  Questions re: plan if baby growth is large on follow up US and discussed eIOL at 39wks if she prefers if growth is on the larger side.     O:    Vitals:    23 1127   BP: 100/46   Weight: 161 lb 9.6 oz   See flow sheet.    A:  IUP at 25w3d  S=D but +2cm  Patient Active Problem List    Diagnosis Date Noted    Excessive fetal growth affecting management of mother in second trimester, antepartum 2023    Marijuana use during pregnancy 2023    Prenatal care, subsequent pregnancy, second trimester 2023    Rh negative state in antepartum period 2017       P:  1. Questions answered.           2. Encouraged adequate water intake        3.   labor precautions reviewed.        4.  Rh negative, will need rhogam at next visit        5.  28wk labs ordered and instructions provided        6.  F/u 3 wks and PRN  Orders Placed This Encounter    GLUCOSE 1HR GESTATIONAL    CBC WITHOUT DIFFERENTIAL    T.PALLIDUM AB AVELINO (SCREENING)      Liliane Haji C.N.M.

## 2023-07-11 NOTE — PROGRESS NOTES
Pt here today for OB follow up  Reports +FM  WT: 161.6 lb  BP: 100/46  Preferred pharmacy verified with pt.  MFM Appointment: not  at this time   Good # [phone]  Pt states no complaints or concerns today    3rd trimester labs ordered today, pt given instructions

## 2023-07-25 ENCOUNTER — HOSPITAL ENCOUNTER (OUTPATIENT)
Dept: LAB | Facility: MEDICAL CENTER | Age: 28
End: 2023-07-25
Payer: MEDICAID

## 2023-07-25 DIAGNOSIS — Z34.82 ENCOUNTER FOR SUPERVISION OF OTHER NORMAL PREGNANCY, SECOND TRIMESTER: ICD-10-CM

## 2023-07-25 LAB
ERYTHROCYTE [DISTWIDTH] IN BLOOD BY AUTOMATED COUNT: 42.9 FL (ref 35.9–50)
GLUCOSE 1H P 50 G GLC PO SERPL-MCNC: 86 MG/DL (ref 70–139)
HCT VFR BLD AUTO: 33.5 % (ref 37–47)
HGB BLD-MCNC: 10.8 G/DL (ref 12–16)
MCH RBC QN AUTO: 29.4 PG (ref 27–33)
MCHC RBC AUTO-ENTMCNC: 32.2 G/DL (ref 32.2–35.5)
MCV RBC AUTO: 91.3 FL (ref 81.4–97.8)
PLATELET # BLD AUTO: 255 K/UL (ref 164–446)
PMV BLD AUTO: 11.5 FL (ref 9–12.9)
RBC # BLD AUTO: 3.67 M/UL (ref 4.2–5.4)
T PALLIDUM AB SER QL IA: NORMAL
WBC # BLD AUTO: 13.6 K/UL (ref 4.8–10.8)

## 2023-07-25 PROCEDURE — 36415 COLL VENOUS BLD VENIPUNCTURE: CPT

## 2023-07-25 PROCEDURE — 86780 TREPONEMA PALLIDUM: CPT

## 2023-07-25 PROCEDURE — 82950 GLUCOSE TEST: CPT

## 2023-07-25 PROCEDURE — 85027 COMPLETE CBC AUTOMATED: CPT

## 2023-08-02 ENCOUNTER — ROUTINE PRENATAL (OUTPATIENT)
Dept: OBGYN | Facility: CLINIC | Age: 28
End: 2023-08-02
Payer: MEDICAID

## 2023-08-02 VITALS — BODY MASS INDEX: 26.42 KG/M2 | WEIGHT: 166.2 LBS | SYSTOLIC BLOOD PRESSURE: 110 MMHG | DIASTOLIC BLOOD PRESSURE: 56 MMHG

## 2023-08-02 DIAGNOSIS — Z34.83 ENCOUNTER FOR SUPERVISION OF OTHER NORMAL PREGNANCY, THIRD TRIMESTER: ICD-10-CM

## 2023-08-02 DIAGNOSIS — O26.899 RH NEGATIVE STATE IN ANTEPARTUM PERIOD: ICD-10-CM

## 2023-08-02 DIAGNOSIS — Z67.91 RH NEGATIVE STATE IN ANTEPARTUM PERIOD: ICD-10-CM

## 2023-08-02 PROCEDURE — 90715 TDAP VACCINE 7 YRS/> IM: CPT

## 2023-08-02 PROCEDURE — 96372 THER/PROPH/DIAG INJ SC/IM: CPT

## 2023-08-02 PROCEDURE — 3078F DIAST BP <80 MM HG: CPT

## 2023-08-02 PROCEDURE — 3074F SYST BP LT 130 MM HG: CPT

## 2023-08-02 PROCEDURE — 90471 IMMUNIZATION ADMIN: CPT

## 2023-08-02 PROCEDURE — 0502F SUBSEQUENT PRENATAL CARE: CPT

## 2023-08-02 NOTE — LETTER
"Count Your Baby's Movements  Another step to a healthy delivery    Hammadlaine LUZDanya Desai             Dept: [phone]    How Many Weeks Pregnant? 28W4D    Date to Begin Counting: Today 08/02/2023              How to use this chart    One way for your physician to keep track of your baby's health is by knowing how often the baby moves (or \"kicks\") in your womb.  You can help your physician to do this by using this chart every day.    Every day, you should see how many hours it takes for your baby to move 10 times.  Start in the morning, as soon as you get up.    First, write down the time your baby moves until you get to 10.  Check off one box every time your baby moves until you get to 10.  Write down the time you finished counting in the last column.  Total how long it took to count up all 10 movements.  Finally, fill in the box that shows how long this took.  After counting 10 movements, you no longer have to count any more that day.  The next morning, just start counting again as soon as you get up.    What should you call a \"movement\"?  It is hard to say, because it will feel different from one mother to another and from one pregnancy to the next.  The important thing is that you count the movements the same way throughout your pregnancy.  If you have more questions, you should ask your physician.    Count carefully every day!  SAMPLE:  Week 28    How many hours did it take to feel 10 movements?       Start  Time     1     2     3     4     5     6     7     8     9     10   Finish Time   Mon 8:20           11:40   Tue Wed Thu               Fri               Sat               Sun                 IMPORTANT: You should contact your physician if it takes more than two hours for you to feel 10 movements.  Each morning, write down the time and start to count the movements of your baby.  Keep track by checking off one box every time you feel one movement.  When you have felt 10 \"kicks\", " write down the time you finished counting in the last column.  Then fill in the   box (over the check vernon) for the number of hours it took.  Be sure to read the complete instructions on the previous page.

## 2023-08-02 NOTE — PROGRESS NOTES
Pt here today for OB follow up  Reports +FM  Preferred pharmacy verified with pt  ELIZABETH sheet given and explained today  Pt declines BTL  Pt desires the Tdap vaccine  MFM appointment: not at this time     Pt states last night was having belly tightness. Pt states when baby was moving states she was feeling discomfort pain.   Good ph # [phone]    RhoGAM to be given today     Per Provider's instructions RhoGAM injection administered today to pt. Consent signed

## 2023-08-02 NOTE — PROGRESS NOTES
S: Champagne here for routine prenatal follow up.  Reports good FM.  Denies VB, LOF, RUCs or vaginal DC. Had some belly tightness and discomforts with fetal movements.     O:    Vitals:    23 0854   BP: 110/56   Weight: 166 lb 3.2 oz     See flow sheet    Third trimester labs:  1 hr GTT: 86  RPR: NR  CBC 10.8/33.5, plt 255    A:    IUP at 28w4d  S=D  Patient Active Problem List    Diagnosis Date Noted    Excessive fetal growth affecting management of mother in second trimester, antepartum 2023    Marijuana use during pregnancy 2023    Prenatal care, subsequent pregnancy, second trimester 2023    Rh negative state in antepartum period 2017          P:  1.  PP contraception plan: undecided but considering vasectomy - advised the process is lengthy.  Declines BTL.         2.  Instructions and handouts given on FKCs.          3.  Questions answered.          4.  Encourage adequate water intake.        5.  Follow up for third trimester labs: n/a - WNL.         6.   labor precautions reviewed.         7.  Rh negative: rhogam administered today        8.  TDap administered today.        9.  F/u 2 wks and PRN  Orders Placed This Encounter    Tdap Vaccine =>6YO IM    Rho D Immune Globulin (Rhogam Ultra-Filtered Plus) injection 1,500 Units    Consent for Rhogam     MADIE CarrionNSULTANA.

## 2023-08-16 ENCOUNTER — HOSPITAL ENCOUNTER (OUTPATIENT)
Dept: RADIOLOGY | Facility: MEDICAL CENTER | Age: 28
End: 2023-08-16
Payer: MEDICAID

## 2023-08-16 DIAGNOSIS — O36.62X0 EXCESSIVE FETAL GROWTH AFFECTING MANAGEMENT OF PREGNANCY IN SECOND TRIMESTER, SINGLE OR UNSPECIFIED FETUS: ICD-10-CM

## 2023-08-16 PROCEDURE — 76816 OB US FOLLOW-UP PER FETUS: CPT

## 2023-08-17 ENCOUNTER — ROUTINE PRENATAL (OUTPATIENT)
Dept: OBGYN | Facility: CLINIC | Age: 28
End: 2023-08-17
Payer: MEDICAID

## 2023-08-17 VITALS — WEIGHT: 169.4 LBS | SYSTOLIC BLOOD PRESSURE: 108 MMHG | DIASTOLIC BLOOD PRESSURE: 52 MMHG | BODY MASS INDEX: 26.93 KG/M2

## 2023-08-17 DIAGNOSIS — Z34.83 ENCOUNTER FOR SUPERVISION OF OTHER NORMAL PREGNANCY IN THIRD TRIMESTER: ICD-10-CM

## 2023-08-17 DIAGNOSIS — O36.62X0 EXCESSIVE FETAL GROWTH AFFECTING MANAGEMENT OF PREGNANCY IN SECOND TRIMESTER, SINGLE OR UNSPECIFIED FETUS: ICD-10-CM

## 2023-08-17 PROBLEM — Z34.90 SUPERVISION OF NORMAL PREGNANCY: Status: ACTIVE | Noted: 2023-08-17

## 2023-08-17 PROCEDURE — 0502F SUBSEQUENT PRENATAL CARE: CPT

## 2023-08-17 PROCEDURE — 3078F DIAST BP <80 MM HG: CPT

## 2023-08-17 PROCEDURE — 3074F SYST BP LT 130 MM HG: CPT

## 2023-08-17 NOTE — PROGRESS NOTES
S:   Champagne is a 28 y.o.  at 30w5d. Her NAKITA is 10/21/2023, by Ultrasound. She is here for routine OB follow up.  Reports positive FM.  Denies VB, LOF, RUCs or vaginal DC. Reports intermittent pain in left gluteus that resolves spontaneously with movement.    Current Outpatient Medications on File Prior to Visit   Medication Sig Dispense Refill    Prenatal MV-Min-Fe Fum-FA-DHA (PRENATAL 1 PO) Take  by mouth.       No current facility-administered medications on file prior to visit.       O:    Vitals:    23 1453   BP: 108/52   Weight: 169 lb 6.4 oz       FH: 31 cm  FHT: 144 BPM    See flow sheet.    A:   IUP 30w5d  S=D  Patient Active Problem List    Diagnosis Date Noted    Supervision of normal pregnancy 2023    Excessive fetal growth affecting management of mother in second trimester, antepartum 2023    Marijuana use during pregnancy 2023    Prenatal care, subsequent pregnancy, second trimester 2023    Rh negative state in antepartum period 2017       P:   Continue FKCs.    Questions answered. Anticipatory guidance.  Encourage adequate water intake.  Interval growth scan ordered for 34-35 weeks d/t LGA.  Discussed applying ice on tender portion of gluteus and frequent movement to rehab the muscle- call or RTC if does not improve or resolve.   labor and return precautions reviewed - patient verbalized understanding.  F/u 2 wks and PRN    Orders Placed This Encounter    US-OB LIMITED GROWTH FOLLOW UP        Pregnancy Checklist  Prenatal labs: + cannabinoids, otherwise PNL WNL  EPDS: 6 at NOB  Last Pap: 3/28/23 NILM  Quad screen/NIPT/MASFP/Carrier Screen: NIPT low risk, MSAFP neg  Anatomy US: AC 93.7%, EFW 98.5% (LGA)  Growth F/U US: BPD 97%, AC 99%, EFW 99%  3rd trimester labs: WNL  Tdap: administered 23  Flu vaccine: not in flu season  Rhogam:administered 23  PP Contraception plan: considering vasectomy for partner  Bilateral salpingectomy: declined  8/2/23  GBS:   Hx C/S: no  IOL plan:    Linnette Mccabe C.N.M.

## 2023-08-17 NOTE — PROGRESS NOTES
Pt here today for OB follow up  Reports +FM  WT: 169.4 lb   BP: 108/52   Preferred pharmacy verified with pt.  MFM Appointment: not at this time   Pt states she has been feeling pain an pulling sometimes on left gluteal a few days after the RhoGAM injection. States no other complaints or concerns today   Good # [phone]    Pt had growth Scan yesterday 08/16/23

## 2023-08-30 ENCOUNTER — ROUTINE PRENATAL (OUTPATIENT)
Dept: OBGYN | Facility: CLINIC | Age: 28
End: 2023-08-30
Payer: MEDICAID

## 2023-08-30 VITALS — BODY MASS INDEX: 27.66 KG/M2 | SYSTOLIC BLOOD PRESSURE: 100 MMHG | DIASTOLIC BLOOD PRESSURE: 58 MMHG | WEIGHT: 174 LBS

## 2023-08-30 DIAGNOSIS — O26.843 UTERINE SIZE DATE DISCREPANCY PREGNANCY, THIRD TRIMESTER: ICD-10-CM

## 2023-08-30 PROCEDURE — 3074F SYST BP LT 130 MM HG: CPT | Performed by: ADVANCED PRACTICE MIDWIFE

## 2023-08-30 PROCEDURE — 3078F DIAST BP <80 MM HG: CPT | Performed by: ADVANCED PRACTICE MIDWIFE

## 2023-08-30 PROCEDURE — 0502F SUBSEQUENT PRENATAL CARE: CPT | Performed by: ADVANCED PRACTICE MIDWIFE

## 2023-08-30 NOTE — PROGRESS NOTES
Pt here today for OB follow up  Pt states mo complaints   Reports +FM  Good # [phone]  Pharmacy Confirmed.  Chaperone offered and not indicated.

## 2023-08-30 NOTE — PROGRESS NOTES
Patient here for AKIL visit at 32w4d of pregnancy. She affirms fetal movement, denies vaginal bleeding or cramping/regular contractions. She reports overall today she is feeling well and without complaints. No recent ER visits since last seen.  Adequate hydration reviewed in detail with patient. Precautions and warning signs reviewed with patient.  RTC in 2 week(s) for AKIL visit.     S>D  98 % on anatomy, follow up growth 99%. Has repeat scheduled. Does have history of 3750g fetus.1hr WNL, hemoglobin A1C added. Discussed with patient that suspected macrosomia is not indication of induction of labor. However, if she desires elective IOL, she can schedule this as early as 39 weeks if desired.  We briefly discussed that concern generally for a non diabetic patient is if fetus EFW is >4500g. Change in management may be offered in that case.   She voices understanding.       Discussed PP BCM and partner considering vasectomy. Patient does not desire any hormonal form of birth control.

## 2023-09-13 ENCOUNTER — HOSPITAL ENCOUNTER (OUTPATIENT)
Dept: LAB | Facility: MEDICAL CENTER | Age: 28
End: 2023-09-13
Attending: ADVANCED PRACTICE MIDWIFE
Payer: MEDICAID

## 2023-09-13 DIAGNOSIS — O26.843 UTERINE SIZE DATE DISCREPANCY PREGNANCY, THIRD TRIMESTER: ICD-10-CM

## 2023-09-13 LAB
EST. AVERAGE GLUCOSE BLD GHB EST-MCNC: 88 MG/DL
HBA1C MFR BLD: 4.7 % (ref 4–5.6)

## 2023-09-13 PROCEDURE — 36415 COLL VENOUS BLD VENIPUNCTURE: CPT

## 2023-09-13 PROCEDURE — 83036 HEMOGLOBIN GLYCOSYLATED A1C: CPT

## 2023-09-14 ENCOUNTER — APPOINTMENT (OUTPATIENT)
Dept: RADIOLOGY | Facility: IMAGING CENTER | Age: 28
End: 2023-09-14
Payer: MEDICAID

## 2023-09-15 ENCOUNTER — APPOINTMENT (OUTPATIENT)
Dept: RADIOLOGY | Facility: IMAGING CENTER | Age: 28
End: 2023-09-15
Payer: MEDICAID

## 2023-09-15 ENCOUNTER — ROUTINE PRENATAL (OUTPATIENT)
Dept: OBGYN | Facility: CLINIC | Age: 28
End: 2023-09-15
Payer: MEDICAID

## 2023-09-15 VITALS — BODY MASS INDEX: 28.65 KG/M2 | WEIGHT: 180.2 LBS | DIASTOLIC BLOOD PRESSURE: 62 MMHG | SYSTOLIC BLOOD PRESSURE: 112 MMHG

## 2023-09-15 DIAGNOSIS — O36.60X0 EXCESSIVE FETAL GROWTH AFFECTING MANAGEMENT OF PREGNANCY, ANTEPARTUM, SINGLE OR UNSPECIFIED FETUS: ICD-10-CM

## 2023-09-15 DIAGNOSIS — O36.62X0 EXCESSIVE FETAL GROWTH AFFECTING MANAGEMENT OF PREGNANCY IN SECOND TRIMESTER, SINGLE OR UNSPECIFIED FETUS: ICD-10-CM

## 2023-09-15 DIAGNOSIS — Z34.82 PRENATAL CARE, SUBSEQUENT PREGNANCY, SECOND TRIMESTER: ICD-10-CM

## 2023-09-15 DIAGNOSIS — Z34.03 ENCOUNTER FOR SUPERVISION OF NORMAL FIRST PREGNANCY IN THIRD TRIMESTER: ICD-10-CM

## 2023-09-15 PROCEDURE — 76816 OB US FOLLOW-UP PER FETUS: CPT | Mod: TC

## 2023-09-15 PROCEDURE — 3078F DIAST BP <80 MM HG: CPT | Performed by: PHYSICIAN ASSISTANT

## 2023-09-15 PROCEDURE — 3074F SYST BP LT 130 MM HG: CPT | Performed by: PHYSICIAN ASSISTANT

## 2023-09-15 PROCEDURE — 0502F SUBSEQUENT PRENATAL CARE: CPT | Performed by: PHYSICIAN ASSISTANT

## 2023-09-15 NOTE — PROGRESS NOTES
Pt. Here for OB/FU. Reports Good FM.   U/S done today at 10:30 am.  Good # [phone]  Pt states having some light cramping.

## 2023-09-15 NOTE — PROGRESS NOTES
Pt has no complaints with cramping, UCs, VB, LOF though pt has had mild cramping, hermilo with walking, as she is walking daughter to school daily. +FM. GBS next visit. US shows 98%, 3200g today, so will plan IOL at 39wk, which pt is happy with. She doesn't want primary C/S, risks d/w pt. IOL referral sent today. PTL precautions reviewed. RTC 2 wk or sooner prn.

## 2023-09-19 ENCOUNTER — TELEPHONE (OUTPATIENT)
Dept: OBGYN | Facility: CLINIC | Age: 28
End: 2023-09-19

## 2023-09-21 ENCOUNTER — HOSPITAL ENCOUNTER (EMERGENCY)
Facility: MEDICAL CENTER | Age: 28
End: 2023-09-21
Attending: OBSTETRICS & GYNECOLOGY | Admitting: OBSTETRICS & GYNECOLOGY
Payer: MEDICAID

## 2023-09-21 VITALS
WEIGHT: 180 LBS | TEMPERATURE: 97.1 F | BODY MASS INDEX: 28.93 KG/M2 | SYSTOLIC BLOOD PRESSURE: 130 MMHG | DIASTOLIC BLOOD PRESSURE: 81 MMHG | HEIGHT: 66 IN | HEART RATE: 84 BPM | RESPIRATION RATE: 16 BRPM | OXYGEN SATURATION: 98 %

## 2023-09-21 PROCEDURE — 99284 EMERGENCY DEPT VISIT MOD MDM: CPT

## 2023-09-21 PROCEDURE — 59025 FETAL NON-STRESS TEST: CPT

## 2023-09-21 ASSESSMENT — PAIN SCALES - GENERAL: PAINLEVEL: 6

## 2023-09-22 NOTE — DISCHARGE INSTRUCTIONS
Pre-term Labor (<37 weeks):  Call your physician or return to the hospital if:  You have painless regular contractions more than 4 in one hour.  Your water breaks (remember time and color).  You have menstrual-like cramps, a low dull backache or pressure in your pelvis or back.  Your baby does not move enough to complete the daily kick count (10 movements in 2 hours).  Your baby moves much less often than on the days before or you have not felt your baby move all day.  Please review the MEDICATION LIST section of your AFTER VISIT SUMMARY document.  Take your medication as prescribed   Labor  Pregnancy normally lasts 39-41 weeks.  labor is when labor starts before you have been pregnant for 37 weeks. Babies who are born too early may have a higher risk for long-term problems like cerebral palsy or developmental delays. They may also have problems soon after birth, such as problems with blood sugar, body temperature, heart, and breathing. These problems may be very serious in babies who are born before 34 weeks of pregnancy.  What are the causes?  The cause of this condition is not known.  What increases the risk?  You are more likely to have  labor if:  You have medical problems, now or in the past.  You have problems now or in your past pregnancies.  You have lifestyle problems.  Medical history  You have problems of the womb (uterus).  You have an infection, including infections you get from sex.  You have problems that do not go away, such as:  Blood clots.  High blood pressure.  High blood sugar.  You have low body weight or too much body weight.  Present and past pregnancies  You have had  labor before.  You are pregnant with two babies or more.  You have a condition in which the placenta covers your cervix.  You waited less than 18 months between giving birth and becoming pregnant again.  Your unborn baby has some problems.  You have bleeding from your vagina.  You became pregnant by  a method called IVF.  Lifestyle  You smoke.  You drink alcohol.  You use drugs.  You have stress.  You have abuse in your home.  You come in contact with chemicals that harm the body (pollutants).  Other factors  You are younger than 17 years or older than 35 years.  What are the signs or symptoms?  Symptoms of this condition include:  Cramps. The cramps may feel like cramps from a period. You may also have watery poop (diarrhea).  Pain in the belly (abdomen).  Pain in the lower back.  Regular contractions. It may feel like your belly is getting tighter.  Pressure in the lower belly.  More fluid leaking from the vagina. The fluid may be watery or bloody.  Water breaking.  How is this treated?  Treatment for this condition depends on your health, the health of your baby, and how old your pregnancy is. It may include:  Taking medicines, such as:  Hormone medicines.  Medicines to stop contractions.  Medicines to help mature the baby's lungs.  Medicines to prevent your baby from getting cerebral palsy or other problems.  Bed rest. If the labor happens before 34 weeks of pregnancy, you may need to stay in the hospital.  Delivering the baby.  Follow these instructions at home:    Do not smoke or use any products that contain nicotine or tobacco. If you need help quitting, ask your doctor.  Do not drink alcohol.  Take over-the-counter and prescription medicines only as told by your doctor.  Rest as told by your doctor.  Return to your normal activities when your doctor says that it is safe.  Keep all follow-up visits.  How is this prevented?  To have a healthy pregnancy:  Do not use drugs.  Do not use any medicines unless you ask your doctor if they are safe for you.  Talk with your doctor before taking any herbal supplements.  Make sure you gain enough weight.  Watch for infection. If you think you might have an infection, get it checked right away. Symptoms of infection may include:  Fever.  Vaginal discharge that smells  bad or is not normal.  Pain or burning when you pee.  Needing to pee urgently.  Needing to pee often.  Peeing small amounts often.  Blood in your pee.  Pee that smells bad or unusual.  Where to find more information  U.S. Department of Health and Human Services Office on Women's Health: www.womenshealth.gov  The American College of Obstetricians and Gynecologists: www.acog.org  Centers for Disease Control and Prevention: www.cdc.gov  Contact a doctor if:  You think you are going into  labor.  You have symptoms of  labor.  You have symptoms of infection.  Get help right away if:  You are having painful contractions every 5 minutes or less.  Your water breaks.  Summary   labor is labor that starts before you reach 37 weeks of pregnancy.  Your baby may have problems if delivered early.  You are more likely to have  labor if you have certain medical problems or problems with a pregnancy now or in the past. Some lifestyle factors can also increase the risk.  Contact a doctor if you have symptoms of  labor.  This information is not intended to replace advice given to you by your health care provider. Make sure you discuss any questions you have with your health care provider.  Document Revised: 2021 Document Reviewed: 2021  Elsevier Patient Education ©  Elsevier Inc.

## 2023-09-22 NOTE — PROGRESS NOTES
28 y.o  at 35w5d presenting with complaints of cramping, back pain and decreased fetal movement (about 5 kicks per hour). Denies LOF, VB.     EFM and TOCO placed. VSS.      MALINDA Barboza updated.     Pt states feeling better and increased fetal movement. MALINDA Ennis at bedside. OK to discharge.      Labor precautions and discharge paperwork provided. No further questions at this time. Pt ambulating in stable condition with self and belongings.

## 2023-09-22 NOTE — ED PROVIDER NOTES
OB ED EVALUATION NOTE    SUBJECTIVE:  Cristin Desai is a 28 y.o.,  at 35w5d who presents for because baby has only been moving 5 times in an hour and this is less than usual. She also reports some mild cramping and mild back pain. She denies vaginal bleeding, leakage of fluid, or contractions.    I personally reviewed the past medical and surgical histories, as well as the problem list.    Patient Active Problem List   Diagnosis    Rh negative state in antepartum period    Prenatal care, subsequent pregnancy, second trimester    Marijuana use during pregnancy    Excessive fetal growth affecting management of mother in second trimester, antepartum    Supervision of normal pregnancy         OBJECTIVE:  Vital Signs:   Vitals:    23   BP: 130/81   Pulse: 84   Resp: 16   Temp: 36.2 °C (97.1 °F)   SpO2: 98%     GEN: NAD  Abd: soft, NT, gravid  Ext: no edema    Pelvic:   SVE: deferred    NST read: Reactive Cat 1 tracing   [place]: quiet     LABS / IMAGING:  Results for orders placed or performed during the hospital encounter of 23   HEMOGLOBIN A1C   Result Value Ref Range    Glycohemoglobin 4.7 4.0 - 5.6 %    Est Avg Glucose 88 mg/dL         ASSESSMENT AND PLAN:  28 y.o.  at 35w5d   Pt has felt normal fetal movement since being placed on fetal monitoring here  Education and reassurance provided       The patient was instructed to follow up in the office. She was counseled to call or return for vaginal bleeding, regular contractions, leakage of fluid or decreased fetal movement.    DREAD Samaniego.

## 2023-09-28 NOTE — PROGRESS NOTES
S:   Champagne is a 28 y.o.  at 37w2d. Her NAKITA is 10/21/2023, by Ultrasound. She is here for routine OB follow up.  Reports positive FM.  Denies VB, LOF, RUCs or vaginal DC. No concerns.    Current Outpatient Medications on File Prior to Visit   Medication Sig Dispense Refill    Prenatal MV-Min-Fe Fum-FA-DHA (PRENATAL 1 PO) Take  by mouth.       No current facility-administered medications on file prior to visit.       O:    Vitals:    10/02/23 1048   BP: 110/70   Weight: 186 lb       FH: 37 cm  FHT: 139 BPM  SVE: /-3, posterior  Presentation: cephalic by SVE      See flow sheet.    A:   IUP 37w2d  S=D  Patient Active Problem List    Diagnosis Date Noted    Supervision of normal pregnancy 2023    Excessive fetal growth affecting management of mother in second trimester, antepartum 2023    Marijuana use during pregnancy 2023    Prenatal care, subsequent pregnancy, second trimester 2023    Rh negative state in antepartum period 2017       P:  Continue FKCs.    Labor and return precautions given.  Instructions given on where to go - patient verbalized understanding.  All questions answered. Anticipatory guidance.  Encouraged adequate water intake.  IOL plan: scheduled for 10/14  GBS collected today  F/u 1 week and PRN    Orders Placed This Encounter    GRP B STREP, BY PCR (CULP BROTH)       Pregnancy Checklist  Prenatal labs: + cannabinoids, otherwise PNL WNL  EPDS: 6 at NOB  Last Pap: 3/28/23 NILM  Quad screen/NIPT/MASFP/Carrier Screen: NIPT low risk, MSAFP neg  Anatomy US: AC 93.7%, EFW 98.5% (LGA)  Growth F/U US: BPD 97%, AC 99%, EFW 99%  3rd trimester labs: WNL  Tdap: administered 23  Flu vaccine: not available at this time  Rhogam:administered 23  PP Contraception plan: considering vasectomy for partner  Bilateral salpingectomy: declined 23  GBS: collected today  Hx C/S: no  IOL plan: scheduled for 10/14    Linnette Mccabe C.N.M.

## 2023-10-02 ENCOUNTER — HOSPITAL ENCOUNTER (OUTPATIENT)
Facility: MEDICAL CENTER | Age: 28
End: 2023-10-02
Payer: MEDICAID

## 2023-10-02 ENCOUNTER — ROUTINE PRENATAL (OUTPATIENT)
Dept: OBGYN | Facility: CLINIC | Age: 28
End: 2023-10-02
Payer: MEDICAID

## 2023-10-02 VITALS — DIASTOLIC BLOOD PRESSURE: 70 MMHG | WEIGHT: 186 LBS | BODY MASS INDEX: 30.02 KG/M2 | SYSTOLIC BLOOD PRESSURE: 110 MMHG

## 2023-10-02 DIAGNOSIS — Z34.83 ENCOUNTER FOR SUPERVISION OF OTHER NORMAL PREGNANCY IN THIRD TRIMESTER: ICD-10-CM

## 2023-10-02 PROCEDURE — 3078F DIAST BP <80 MM HG: CPT

## 2023-10-02 PROCEDURE — 0502F SUBSEQUENT PRENATAL CARE: CPT

## 2023-10-02 PROCEDURE — 87081 CULTURE SCREEN ONLY: CPT

## 2023-10-02 PROCEDURE — 3074F SYST BP LT 130 MM HG: CPT

## 2023-10-02 PROCEDURE — 87150 DNA/RNA AMPLIFIED PROBE: CPT

## 2023-10-02 NOTE — PROGRESS NOTES
OB follow up   + fetal movement.  No VB, LOF or UC's.  Phone # [phone]  Preferred pharmacy confirmed.  Flu vaccine offered and declined  GBS today

## 2023-10-03 LAB — GP B STREP DNA SPEC QL NAA+PROBE: NEGATIVE

## 2023-10-10 ENCOUNTER — ROUTINE PRENATAL (OUTPATIENT)
Dept: OBGYN | Facility: CLINIC | Age: 28
End: 2023-10-10
Payer: MEDICAID

## 2023-10-10 VITALS — WEIGHT: 191 LBS | BODY MASS INDEX: 30.83 KG/M2

## 2023-10-10 DIAGNOSIS — Z34.83 ENCOUNTER FOR SUPERVISION OF OTHER NORMAL PREGNANCY IN THIRD TRIMESTER: ICD-10-CM

## 2023-10-10 PROCEDURE — 0502F SUBSEQUENT PRENATAL CARE: CPT | Performed by: OBSTETRICS & GYNECOLOGY

## 2023-10-14 ENCOUNTER — HOSPITAL ENCOUNTER (OUTPATIENT)
Dept: OBGYN | Facility: MEDICAL CENTER | Age: 28
End: 2023-10-14
Attending: STUDENT IN AN ORGANIZED HEALTH CARE EDUCATION/TRAINING PROGRAM
Payer: MEDICAID

## 2023-10-14 ENCOUNTER — HOSPITAL ENCOUNTER (INPATIENT)
Facility: MEDICAL CENTER | Age: 28
LOS: 2 days | End: 2023-10-16
Attending: STUDENT IN AN ORGANIZED HEALTH CARE EDUCATION/TRAINING PROGRAM | Admitting: STUDENT IN AN ORGANIZED HEALTH CARE EDUCATION/TRAINING PROGRAM
Payer: MEDICAID

## 2023-10-14 PROBLEM — Z34.90 ENCOUNTER FOR PLANNED INDUCTION OF LABOR: Status: ACTIVE | Noted: 2023-10-14

## 2023-10-14 LAB
BASOPHILS # BLD AUTO: 0.3 % (ref 0–1.8)
BASOPHILS # BLD: 0.04 K/UL (ref 0–0.12)
EOSINOPHIL # BLD AUTO: 0.06 K/UL (ref 0–0.51)
EOSINOPHIL NFR BLD: 0.5 % (ref 0–6.9)
ERYTHROCYTE [DISTWIDTH] IN BLOOD BY AUTOMATED COUNT: 46.1 FL (ref 35.9–50)
HCT VFR BLD AUTO: 31.5 % (ref 37–47)
HGB BLD-MCNC: 9.7 G/DL (ref 12–16)
HOLDING TUBE BB 8507: NORMAL
IMM GRANULOCYTES # BLD AUTO: 0.11 K/UL (ref 0–0.11)
IMM GRANULOCYTES NFR BLD AUTO: 0.9 % (ref 0–0.9)
LYMPHOCYTES # BLD AUTO: 2.75 K/UL (ref 1–4.8)
LYMPHOCYTES NFR BLD: 22.1 % (ref 22–41)
MCH RBC QN AUTO: 25.5 PG (ref 27–33)
MCHC RBC AUTO-ENTMCNC: 30.8 G/DL (ref 32.2–35.5)
MCV RBC AUTO: 82.7 FL (ref 81.4–97.8)
MONOCYTES # BLD AUTO: 0.41 K/UL (ref 0–0.85)
MONOCYTES NFR BLD AUTO: 3.3 % (ref 0–13.4)
NEUTROPHILS # BLD AUTO: 9.07 K/UL (ref 1.82–7.42)
NEUTROPHILS NFR BLD: 72.9 % (ref 44–72)
NRBC # BLD AUTO: 0 K/UL
NRBC BLD-RTO: 0 /100 WBC (ref 0–0.2)
PLATELET # BLD AUTO: 267 K/UL (ref 164–446)
PMV BLD AUTO: 11.8 FL (ref 9–12.9)
RBC # BLD AUTO: 3.81 M/UL (ref 4.2–5.4)
T PALLIDUM AB SER QL IA: NORMAL
WBC # BLD AUTO: 12.4 K/UL (ref 4.8–10.8)

## 2023-10-14 PROCEDURE — A9270 NON-COVERED ITEM OR SERVICE: HCPCS

## 2023-10-14 PROCEDURE — 85025 COMPLETE CBC W/AUTO DIFF WBC: CPT

## 2023-10-14 PROCEDURE — 36415 COLL VENOUS BLD VENIPUNCTURE: CPT

## 2023-10-14 PROCEDURE — 3E0P7VZ INTRODUCTION OF HORMONE INTO FEMALE REPRODUCTIVE, VIA NATURAL OR ARTIFICIAL OPENING: ICD-10-PCS | Performed by: OBSTETRICS & GYNECOLOGY

## 2023-10-14 PROCEDURE — 700102 HCHG RX REV CODE 250 W/ 637 OVERRIDE(OP)

## 2023-10-14 PROCEDURE — 770002 HCHG ROOM/CARE - OB PRIVATE (112)

## 2023-10-14 PROCEDURE — 86780 TREPONEMA PALLIDUM: CPT

## 2023-10-14 RX ORDER — TERBUTALINE SULFATE 1 MG/ML
0.25 INJECTION, SOLUTION SUBCUTANEOUS
Status: DISCONTINUED | OUTPATIENT
Start: 2023-10-14 | End: 2023-10-15 | Stop reason: HOSPADM

## 2023-10-14 RX ORDER — OXYTOCIN 10 [USP'U]/ML
10 INJECTION, SOLUTION INTRAMUSCULAR; INTRAVENOUS
Status: DISCONTINUED | OUTPATIENT
Start: 2023-10-14 | End: 2023-10-15 | Stop reason: HOSPADM

## 2023-10-14 RX ORDER — ACETAMINOPHEN 500 MG
1000 TABLET ORAL
Status: DISCONTINUED | OUTPATIENT
Start: 2023-10-14 | End: 2023-10-15 | Stop reason: HOSPADM

## 2023-10-14 RX ORDER — SODIUM CHLORIDE, SODIUM LACTATE, POTASSIUM CHLORIDE, CALCIUM CHLORIDE 600; 310; 30; 20 MG/100ML; MG/100ML; MG/100ML; MG/100ML
INJECTION, SOLUTION INTRAVENOUS CONTINUOUS
Status: DISCONTINUED | OUTPATIENT
Start: 2023-10-14 | End: 2023-10-16 | Stop reason: HOSPADM

## 2023-10-14 RX ORDER — IBUPROFEN 800 MG/1
800 TABLET ORAL
Status: DISCONTINUED | OUTPATIENT
Start: 2023-10-14 | End: 2023-10-15 | Stop reason: HOSPADM

## 2023-10-14 RX ORDER — LIDOCAINE HYDROCHLORIDE 10 MG/ML
20 INJECTION, SOLUTION INFILTRATION; PERINEURAL
Status: DISCONTINUED | OUTPATIENT
Start: 2023-10-14 | End: 2023-10-15 | Stop reason: HOSPADM

## 2023-10-14 RX ADMIN — DINOPROSTONE 10 MG: 10 INSERT VAGINAL at 11:53

## 2023-10-14 ASSESSMENT — PATIENT HEALTH QUESTIONNAIRE - PHQ9
SUM OF ALL RESPONSES TO PHQ9 QUESTIONS 1 AND 2: 0
2. FEELING DOWN, DEPRESSED, IRRITABLE, OR HOPELESS: NOT AT ALL
1. LITTLE INTEREST OR PLEASURE IN DOING THINGS: NOT AT ALL

## 2023-10-14 ASSESSMENT — PAIN DESCRIPTION - PAIN TYPE
TYPE: ACUTE PAIN

## 2023-10-14 ASSESSMENT — PAIN SCALES - GENERAL
PAINLEVEL: 0 - NO PAIN
PAINLEVEL: 0 - NO PAIN

## 2023-10-14 NOTE — H&P
OB H&P:    CC: Scheduled induction of labor     HPI:  Ms. Cristin Desai is a 28 y.o.  @ 39w0d by 7 week US presents for scheduled IOL secondary to fetal macrosomia. Last ultrasound on 9/15/2023 showed EFW 3.229g and EFW 98%.  She reports no complications in her previous pregnancy or other complications during this current pregnancy.  She denies a chronic medical history, and only has been taking prenatal vitamins during her pregnancy.  She is Rh-, received RhoGAM 2023.    Contractions: No   Loss of fluid: No   Vaginal bleeding: No   Fetal movement: present     PN with [facility].    PNL:  Blood Type O, Rh negative (Rhogam 2023), Rubella immune, HIV neg, TrepAb neg, HBsAg NR, GC/CT neg/neg  1 HR GTT: 86  GBS negative       ROS:  Const: denies fevers, general concerns  CV/resp: reports no concerns  GI: denies abd pain, GI concerns  : see HPI  Neuro: denies HA/vision changes    OB History    Para Term  AB Living   2 1 1     1   SAB IAB Ectopic Molar Multiple Live Births             1      # Outcome Date GA Lbr Jose Luis/2nd Weight Sex Delivery Anes PTL Lv   2 Current            1 Term 03/10/18 40w3d  3.715 kg (8 lb 3 oz) F Vag-Spont EPI N TERRELL      Birth Comments: Pt states no complications       GYN: denies STIs, no cervical procedures    Past Medical History:   Diagnosis Date    Headache(784.0)        History reviewed. No pertinent surgical history.    No current facility-administered medications on file prior to encounter.     Current Outpatient Medications on File Prior to Encounter   Medication Sig Dispense Refill    Prenatal MV-Min-Fe Fum-FA-DHA (PRENATAL 1 PO) Take  by mouth.         Family History   Problem Relation Age of Onset    GI Disease Mother         hep c    No Known Problems Father     No Known Problems Brother     No Known Problems Brother        Social History     Tobacco Use    Smoking status: Never    Smokeless tobacco: Never   Vaping Use    Vaping Use:  "Never used   Substance Use Topics    Alcohol use: No    Drug use: Not Currently     Types: Marijuana     Comment: last used marijuana 2023         PE:  Vitals:    10/14/23 0921   BP: 129/82   Pulse: 78   Resp: 16   Temp: 36.7 °C (98.1 °F)   TempSrc: Temporal   Weight: 86.6 kg (191 lb)   Height: 1.676 m (5' 6\")       GEN: AAO, NAD  HEENT: normocephalic, atraumatic, EOMI  CV: rrr, no m/r/g  Resp: CTAB, no w/r/r  Abd: soft, gravid, NT  Ext: NT, no peripheral edema  Derm: no visible lesions or rashes  Neuro: grossly intact    SVE: 0.5cm/thick/high    FHT: Baseline 130s/moderate variability/+ accels/ - decels  [place]: Baseline irritability without contractions    A/P: 28 y.o.  @ 39w0d presents for scheduled induction of labor for fetal macrosomia    #SIUP @ 39w0d by 7 week US  -Adequate PNL with [facility]   -PNL positive for marijuana use in pregnancy, otherwise negative     #IOL for fetal macrosomia   - Last growth US 9/15/23 showed EFW 98%   - Confirmed vertex position with bedside US  - Labor: latent   - Cervidil insert for induction  - Pitocin when appropriate     - Pain: plan for epidural     #Fetal status   - FHT: cat I    #GBS status  -GBS: negative      #Rubella immune, HIV neg, TrepAb neg, HBsAg NR, GC/CT neg/neg    #Rh negative  -Rhogam:administered 23  -Will receive Rhogam following delivery     #healthcare maintenance   -TdaP 23      Joy Culver D.O.  PGY-1, [employer] [facility]       "

## 2023-10-14 NOTE — CARE PLAN
Problem: Psychosocial - L&D  Goal: Patient's level of anxiety will decrease  Description: Target End Date:  1-3 days or as soon as patient condition allows    1.  Collaborate with patient and family/caregiver to identify triggers and develop strategies to cope with anxiety  2.  Implement stimuli reduction, calming techniques  3.  Pharmacologic management per provider order  4.  Encourage patient/family/care giver participation  5.  Collaborate with interdisciplinary team including Psychologist or Behavioral Health Team as needed  Outcome: Progressing  Goal: Patient will be able to discuss coping skills during hospitalization  Description: Target End Date:  1 to 3 days    1.  Assess patient and support person's emotional response  2.  Review patient and support person's participation and role in birth experience.  Identify positive behaviors during the process  3.  Encourage presence/participation of support person  Outcome: Progressing  Goal: Spiritual and cultural needs incorporated into hospitalization  Description: Target End Date:  End of day 1    1.  Encourage verbalization of feelings, concerns, expectations and needs  2.  Collaborate with Case Management/  3.  Collaborate with Pastoral Care to meet spiritual needs  Outcome: Progressing   The patient is Stable - Low risk of patient condition declining or worsening    Shift Goals  Clinical Goals: Active Labor  Patient Goals: Healthy mom and baby  Family Goals: Healthy mom and baby    Progress made toward(s) clinical / shift goals:  Induction of labor started     Patient is not progressing towards the following goals:NA

## 2023-10-14 NOTE — PROGRESS NOTES
1040 - Dr. Sage in procedure - now available for orders and assessment.      1100 - Bedside u/s done to confirm fetal position.  Baby is vertex per Dr. Culver.      1258 - Pt repositioned and monitors readjusted.     1305 - Pt sitting high fowlers - EFM's readjusted.

## 2023-10-15 ENCOUNTER — ANESTHESIA EVENT (OUTPATIENT)
Dept: ANESTHESIOLOGY | Facility: MEDICAL CENTER | Age: 28
End: 2023-10-15
Payer: MEDICAID

## 2023-10-15 ENCOUNTER — ANESTHESIA (OUTPATIENT)
Dept: ANESTHESIOLOGY | Facility: MEDICAL CENTER | Age: 28
End: 2023-10-15
Payer: MEDICAID

## 2023-10-15 LAB — NUMBER OF RH DOSES IND 8505RD: NORMAL

## 2023-10-15 PROCEDURE — 700111 HCHG RX REV CODE 636 W/ 250 OVERRIDE (IP): Mod: JZ | Performed by: ANESTHESIOLOGY

## 2023-10-15 PROCEDURE — 59409 OBSTETRICAL CARE: CPT

## 2023-10-15 PROCEDURE — 36415 COLL VENOUS BLD VENIPUNCTURE: CPT

## 2023-10-15 PROCEDURE — 304965 HCHG RECOVERY SERVICES

## 2023-10-15 PROCEDURE — 700111 HCHG RX REV CODE 636 W/ 250 OVERRIDE (IP): Mod: JZ

## 2023-10-15 PROCEDURE — 700111 HCHG RX REV CODE 636 W/ 250 OVERRIDE (IP)

## 2023-10-15 PROCEDURE — 700111 HCHG RX REV CODE 636 W/ 250 OVERRIDE (IP): Performed by: ADVANCED PRACTICE MIDWIFE

## 2023-10-15 PROCEDURE — 10907ZC DRAINAGE OF AMNIOTIC FLUID, THERAPEUTIC FROM PRODUCTS OF CONCEPTION, VIA NATURAL OR ARTIFICIAL OPENING: ICD-10-PCS | Performed by: ADVANCED PRACTICE MIDWIFE

## 2023-10-15 PROCEDURE — A9270 NON-COVERED ITEM OR SERVICE: HCPCS | Performed by: ADVANCED PRACTICE MIDWIFE

## 2023-10-15 PROCEDURE — 700105 HCHG RX REV CODE 258

## 2023-10-15 PROCEDURE — 0HQ9XZZ REPAIR PERINEUM SKIN, EXTERNAL APPROACH: ICD-10-PCS | Performed by: ADVANCED PRACTICE MIDWIFE

## 2023-10-15 PROCEDURE — 770002 HCHG ROOM/CARE - OB PRIVATE (112)

## 2023-10-15 PROCEDURE — 303615 HCHG EPIDURAL/SPINAL ANESTHESIA FOR LABOR

## 2023-10-15 PROCEDURE — 700105 HCHG RX REV CODE 258: Performed by: ADVANCED PRACTICE MIDWIFE

## 2023-10-15 PROCEDURE — 700102 HCHG RX REV CODE 250 W/ 637 OVERRIDE(OP): Performed by: ADVANCED PRACTICE MIDWIFE

## 2023-10-15 PROCEDURE — 59400 OBSTETRICAL CARE: CPT | Performed by: ADVANCED PRACTICE MIDWIFE

## 2023-10-15 RX ORDER — VITAMIN A ACETATE, BETA CAROTENE, ASCORBIC ACID, CHOLECALCIFEROL, .ALPHA.-TOCOPHEROL ACETATE, DL-, THIAMINE MONONITRATE, RIBOFLAVIN, NIACINAMIDE, PYRIDOXINE HYDROCHLORIDE, FOLIC ACID, CYANOCOBALAMIN, CALCIUM CARBONATE, FERROUS FUMARATE, ZINC OXIDE, CUPRIC OXIDE 3080; 12; 120; 400; 1; 1.84; 3; 20; 22; 920; 25; 200; 27; 10; 2 [IU]/1; UG/1; MG/1; [IU]/1; MG/1; MG/1; MG/1; MG/1; MG/1; [IU]/1; MG/1; MG/1; MG/1; MG/1; MG/1
1 TABLET, FILM COATED ORAL
Status: DISCONTINUED | OUTPATIENT
Start: 2023-10-16 | End: 2023-10-16 | Stop reason: HOSPADM

## 2023-10-15 RX ORDER — EPHEDRINE SULFATE 50 MG/ML
5 INJECTION, SOLUTION INTRAVENOUS
Status: DISCONTINUED | OUTPATIENT
Start: 2023-10-15 | End: 2023-10-15 | Stop reason: HOSPADM

## 2023-10-15 RX ORDER — ROPIVACAINE HYDROCHLORIDE 2 MG/ML
INJECTION, SOLUTION EPIDURAL; INFILTRATION; PERINEURAL
Status: COMPLETED
Start: 2023-10-15 | End: 2023-10-15

## 2023-10-15 RX ORDER — IBUPROFEN 800 MG/1
800 TABLET ORAL EVERY 8 HOURS PRN
Status: DISCONTINUED | OUTPATIENT
Start: 2023-10-15 | End: 2023-10-16 | Stop reason: HOSPADM

## 2023-10-15 RX ORDER — BUPIVACAINE HYDROCHLORIDE 2.5 MG/ML
INJECTION, SOLUTION EPIDURAL; INFILTRATION; INTRACAUDAL
Status: COMPLETED
Start: 2023-10-15 | End: 2023-10-15

## 2023-10-15 RX ORDER — SODIUM CHLORIDE, SODIUM LACTATE, POTASSIUM CHLORIDE, CALCIUM CHLORIDE 600; 310; 30; 20 MG/100ML; MG/100ML; MG/100ML; MG/100ML
INJECTION, SOLUTION INTRAVENOUS PRN
Status: DISCONTINUED | OUTPATIENT
Start: 2023-10-15 | End: 2023-10-16 | Stop reason: HOSPADM

## 2023-10-15 RX ORDER — SODIUM CHLORIDE, SODIUM LACTATE, POTASSIUM CHLORIDE, AND CALCIUM CHLORIDE .6; .31; .03; .02 G/100ML; G/100ML; G/100ML; G/100ML
250 INJECTION, SOLUTION INTRAVENOUS PRN
Status: DISCONTINUED | OUTPATIENT
Start: 2023-10-15 | End: 2023-10-15 | Stop reason: HOSPADM

## 2023-10-15 RX ORDER — SODIUM CHLORIDE, SODIUM LACTATE, POTASSIUM CHLORIDE, AND CALCIUM CHLORIDE .6; .31; .03; .02 G/100ML; G/100ML; G/100ML; G/100ML
1000 INJECTION, SOLUTION INTRAVENOUS
Status: DISCONTINUED | OUTPATIENT
Start: 2023-10-15 | End: 2023-10-15 | Stop reason: HOSPADM

## 2023-10-15 RX ORDER — ACETAMINOPHEN 500 MG
1000 TABLET ORAL EVERY 6 HOURS PRN
Status: DISCONTINUED | OUTPATIENT
Start: 2023-10-15 | End: 2023-10-16 | Stop reason: HOSPADM

## 2023-10-15 RX ORDER — DOCUSATE SODIUM 100 MG/1
100 CAPSULE, LIQUID FILLED ORAL 2 TIMES DAILY PRN
Status: DISCONTINUED | OUTPATIENT
Start: 2023-10-15 | End: 2023-10-16 | Stop reason: HOSPADM

## 2023-10-15 RX ORDER — ROPIVACAINE HYDROCHLORIDE 2 MG/ML
INJECTION, SOLUTION EPIDURAL; INFILTRATION; PERINEURAL CONTINUOUS
Status: DISCONTINUED | OUTPATIENT
Start: 2023-10-15 | End: 2023-10-16 | Stop reason: HOSPADM

## 2023-10-15 RX ORDER — BUPIVACAINE HYDROCHLORIDE 2.5 MG/ML
INJECTION, SOLUTION EPIDURAL; INFILTRATION; INTRACAUDAL PRN
Status: DISCONTINUED | OUTPATIENT
Start: 2023-10-15 | End: 2023-10-15 | Stop reason: SURG

## 2023-10-15 RX ADMIN — FENTANYL CITRATE 50 MCG: 50 INJECTION, SOLUTION INTRAMUSCULAR; INTRAVENOUS at 02:30

## 2023-10-15 RX ADMIN — FENTANYL CITRATE 50 MCG: 50 INJECTION, SOLUTION INTRAMUSCULAR; INTRAVENOUS at 02:25

## 2023-10-15 RX ADMIN — ROPIVACAINE HYDROCHLORIDE: 2 INJECTION, SOLUTION EPIDURAL; INFILTRATION; PERINEURAL at 02:28

## 2023-10-15 RX ADMIN — SODIUM CHLORIDE, POTASSIUM CHLORIDE, SODIUM LACTATE AND CALCIUM CHLORIDE: 600; 310; 30; 20 INJECTION, SOLUTION INTRAVENOUS at 01:01

## 2023-10-15 RX ADMIN — BUPIVACAINE HYDROCHLORIDE 3 ML: 2.5 INJECTION, SOLUTION EPIDURAL; INFILTRATION; INTRACAUDAL at 02:30

## 2023-10-15 RX ADMIN — OXYTOCIN 125 ML/HR: 10 INJECTION, SOLUTION INTRAMUSCULAR; INTRAVENOUS at 08:05

## 2023-10-15 RX ADMIN — OXYTOCIN 2 MILLI-UNITS/MIN: 10 INJECTION, SOLUTION INTRAMUSCULAR; INTRAVENOUS at 01:06

## 2023-10-15 RX ADMIN — OXYTOCIN 125 ML/HR: 10 INJECTION, SOLUTION INTRAMUSCULAR; INTRAVENOUS at 09:26

## 2023-10-15 RX ADMIN — ACETAMINOPHEN 1000 MG: 500 TABLET, FILM COATED ORAL at 13:09

## 2023-10-15 RX ADMIN — OXYTOCIN 20 UNITS: 10 INJECTION, SOLUTION INTRAMUSCULAR; INTRAVENOUS at 06:54

## 2023-10-15 RX ADMIN — IBUPROFEN 800 MG: 800 TABLET, FILM COATED ORAL at 18:08

## 2023-10-15 RX ADMIN — ROPIVACAINE HYDROCHLORIDE: 2 INJECTION, SOLUTION EPIDURAL; INFILTRATION at 02:28

## 2023-10-15 RX ADMIN — SODIUM CHLORIDE, POTASSIUM CHLORIDE, SODIUM LACTATE AND CALCIUM CHLORIDE: 600; 310; 30; 20 INJECTION, SOLUTION INTRAVENOUS at 02:49

## 2023-10-15 RX ADMIN — BUPIVACAINE HYDROCHLORIDE 3 ML: 2.5 INJECTION, SOLUTION EPIDURAL; INFILTRATION; INTRACAUDAL at 02:25

## 2023-10-15 ASSESSMENT — PAIN DESCRIPTION - PAIN TYPE
TYPE: ACUTE PAIN

## 2023-10-15 NOTE — PROGRESS NOTES
0650  Report from NOC RN in room with pt at this time and pt pushing with delivery.  Viable baby girl delivered at 0650, first degree laceration with apgars 8/9.  FF with light lochia.  0830  Pt has no urgency for voiding at this time,  pt understands to notify RN if she needs to void and not to get up on her own.  Will return later to assist pt to BR.  0900  Pt up to BR, voided, pericare performed and pt into W/C.  Epidural removed and pt has no new complaints of pain.  0915  Report to PP RN in room with pt at this time.

## 2023-10-15 NOTE — ANESTHESIA PROCEDURE NOTES
Epidural Block    Date/Time: 10/15/2023 2:19 AM    Performed by: Huan Elizondo M.D.  Authorized by: Huan Elizondo M.D.    Patient Location:  OB  Start Time:  10/15/2023 2:19 AM  End Time:  10/15/2023 2:25 AM  Reason for Block: labor analgesia    patient identified, IV checked, site marked, risks and benefits discussed, surgical consent, monitors and equipment checked and pre-op evaluation    Patient Position:  Sitting  Prep: ChloraPrep, patient draped and sterile technique    Monitoring:  Blood pressure, continuous pulse oximetry and heart rate  Approach:  Midline  Location:  L2-L3  Injection Technique:  LISSETH saline  Skin infiltration:  Lidocaine  Strength:  1%  Dose:  3ml  Needle Type:  Tuohy  Needle Gauge:  17 G  Needle Length:  3.5 in  Loss of resistance::  4.5  Catheter Size:  19 G  Catheter at Skin Depth:  11  Test Dose Result:  Negative   Success on 2nd pass at same level  No heme/CSF  Catheter threaded easily  Negative aspiration  No evidence of complications  Patient comfortable after loading dose

## 2023-10-15 NOTE — LACTATION NOTE
Cristin is a 28 year old   who delivered vaginally this am at 39+1 weeks. She has a 5 year old daughter who was formula fed.     This baby nursed soon after delivery and two additional times. She has had two urine diapers since birth.    Cristin did test positive for THC and we discussed not using THC products during breastfeeding. Written handout given. Mom states she does not plan to use marijuana during her time breastfeeding.     Cristin denied having any questions or concerns at this time. Mom guaranteed of continuing support.

## 2023-10-15 NOTE — CARE PLAN
The patient is Stable - Low risk of patient condition declining or worsening    Shift Goals  Clinical Goals: cervical change  Patient Goals: healthy mom healthy baby  Family Goals: healthy mom healthy baby    Progress made toward(s) clinical / shift goals:    Problem: Knowledge Deficit - L&D  Goal: Patient and family/caregivers will demonstrate understanding of plan of care, disease process/condition, diagnostic tests and medications  Outcome: Progressing     Problem: Pain  Goal: Patient's pain will be alleviated or reduced to the patient’s comfort goal  Outcome: Progressing

## 2023-10-15 NOTE — L&D DELIVERY NOTE
Admit Date:   10/14/2023      Pregnancy Complications: none  Medical Induction of Labor: no  GBS: negative  Anesthesia: epidural  Gestational Age at delivery: 39.1    Patient  progressed well with cervidil and  to deliver viable female infant in REJI position at 0650 with coached pushing efforts. Nuchal x1 reduced via Somersault maneuver and infant placed to maternal abdomen where she was dried and stimulated. A spontaneous cry was noted. Apgar 8, 9      Pitocin infused via IV bolus. After delayed cord clamping, cord was doubly clamped and cut by father. Signs of placenta separation noted and gentle cord traction was completed. Intact placenta was delivered spontaneously in [place] presentation at 0701 where 3VC was noted. EBL 250ml. Fundus firm with minimal massage. Inspection of vulva and vagina was completed and 1st degree laceration was noted and repaired in usual fashion with hemostasis. Routine postpartum care was initiated as mother and infant stable. Infant weight is 3730g.    Delivery of infant and placenta performed by TAY Campos MS3 under my direct supervision.     Trey DEL REAL CNM/ Dr. Sage, Attending

## 2023-10-15 NOTE — CARE PLAN
The patient is Stable - Low risk of patient condition declining or worsening    Shift Goals  Clinical Goals: light lochia, VSS  Patient Goals: pain control, bond  Family Goals: support    Progress made toward(s) clinical / shift goals:    Problem: Knowledge Deficit - Postpartum  Goal: Patient will verbalize and demonstrate understanding of self and infant care  Outcome: Progressing     Problem: Psychosocial - Postpartum  Goal: Patient will verbalize and demonstrate effective bonding and parenting behavior  Outcome: Progressing       Patient is not progressing towards the following goals:

## 2023-10-15 NOTE — PROGRESS NOTES
1850: Report received from Hoda MCKOY. POC discussed. Call button within reach. Care assumed.  0650: Delivery of viable female fetus, APGARs 8/9. Report given to Merissa MATHEWS RN. Care relinquished.

## 2023-10-15 NOTE — ANESTHESIA PREPROCEDURE EVALUATION
Date: 10/15/23  Procedure: Labor Epidural        gimenez pregnancy at 39 weeks gestation.     Relevant Problems   No relevant active problems       Physical Exam    Airway   Mallampati: II  TM distance: >3 FB  Neck ROM: full       Cardiovascular - normal exam  Rhythm: regular  Rate: normal  (-) murmur     Dental - normal exam           Pulmonary - normal exam  Breath sounds clear to auscultation     Abdominal    Neurological - normal exam                 Anesthesia Plan    ASA 2       Plan - epidural   Neuraxial block will be labor analgesia                  Pertinent diagnostic labs and testing reviewed    Informed Consent:    Anesthetic plan and risks discussed with patient.

## 2023-10-15 NOTE — PROGRESS NOTES
S: Pt is laying in bed s/p epidural placement. She reports pain is better controlled. Discussed AROM with patient and she is amenable. Partner at bedside.      O:    Vitals:    10/15/23 0238 10/15/23 0244 10/15/23 0249 10/15/23 0254   BP: 129/76 127/70 130/72 132/72   Pulse: 86 81 72 77   Resp:       Temp:    36.4 °C (97.5 °F)   TempSrc:    Temporal   SpO2:       Weight:       Height:               FHTs:  Baseline 140, pos accels, no decels, moderate variability        [place]: Contractions q 2-4 minutes, moderate to palpation, pitocin @ 2 milliunits        SVE: 460/-2 AROM returning small amount of blood tinged fluid     A/P:    1.  IUP @ 39w1d   2.  Cat I FHTs    3.  Early Labor- continue pitocin per protocol. Plan to recheck cervical exam in 4 hours or sooner if indicated.   Anticipate .     NASIMA Jackman, NORBERT

## 2023-10-15 NOTE — PROGRESS NOTES
Pt arrived to room 321 from L&D via wheelchair transport. Transferred to bed independently. Bedside report received from Merissa MCKOY. Pt and SO oriented to room and unit, pain management options, fran care, POC and safety precautions. Questions answered and pt verbalizes understanding. Siderails up x2, bed level down, call light within reach, no further questions or concerns at this time.

## 2023-10-16 VITALS
WEIGHT: 191 LBS | HEIGHT: 66 IN | RESPIRATION RATE: 18 BRPM | TEMPERATURE: 98.3 F | BODY MASS INDEX: 30.7 KG/M2 | SYSTOLIC BLOOD PRESSURE: 138 MMHG | DIASTOLIC BLOOD PRESSURE: 85 MMHG | OXYGEN SATURATION: 97 % | HEART RATE: 72 BPM

## 2023-10-16 LAB
ERYTHROCYTE [DISTWIDTH] IN BLOOD BY AUTOMATED COUNT: 47.1 FL (ref 35.9–50)
HCT VFR BLD AUTO: 31.2 % (ref 37–47)
HGB BLD-MCNC: 9.7 G/DL (ref 12–16)
MCH RBC QN AUTO: 25.6 PG (ref 27–33)
MCHC RBC AUTO-ENTMCNC: 31.1 G/DL (ref 32.2–35.5)
MCV RBC AUTO: 82.3 FL (ref 81.4–97.8)
PLATELET # BLD AUTO: 223 K/UL (ref 164–446)
PMV BLD AUTO: 12.1 FL (ref 9–12.9)
RBC # BLD AUTO: 3.79 M/UL (ref 4.2–5.4)
WBC # BLD AUTO: 14.6 K/UL (ref 4.8–10.8)

## 2023-10-16 PROCEDURE — 700102 HCHG RX REV CODE 250 W/ 637 OVERRIDE(OP): Performed by: ADVANCED PRACTICE MIDWIFE

## 2023-10-16 PROCEDURE — 36415 COLL VENOUS BLD VENIPUNCTURE: CPT

## 2023-10-16 PROCEDURE — 85027 COMPLETE CBC AUTOMATED: CPT

## 2023-10-16 PROCEDURE — A9270 NON-COVERED ITEM OR SERVICE: HCPCS | Performed by: ADVANCED PRACTICE MIDWIFE

## 2023-10-16 RX ORDER — ACETAMINOPHEN 500 MG
1000 TABLET ORAL EVERY 6 HOURS PRN
Qty: 30 TABLET | Refills: 0 | Status: SHIPPED | OUTPATIENT
Start: 2023-10-16 | End: 2023-11-21

## 2023-10-16 RX ORDER — IBUPROFEN 800 MG/1
800 TABLET ORAL EVERY 8 HOURS PRN
Qty: 30 TABLET | Refills: 0 | Status: SHIPPED | OUTPATIENT
Start: 2023-10-16 | End: 2023-11-21

## 2023-10-16 RX ORDER — PSEUDOEPHEDRINE HCL 30 MG
100 TABLET ORAL 2 TIMES DAILY PRN
Qty: 60 CAPSULE | Refills: 0 | Status: SHIPPED | OUTPATIENT
Start: 2023-10-16 | End: 2023-11-21

## 2023-10-16 RX ORDER — FERROUS SULFATE 325(65) MG
325 TABLET ORAL DAILY
Qty: 30 TABLET | Refills: 4 | Status: SHIPPED | OUTPATIENT
Start: 2023-10-16 | End: 2023-11-21

## 2023-10-16 RX ADMIN — ACETAMINOPHEN 1000 MG: 500 TABLET, FILM COATED ORAL at 09:38

## 2023-10-16 RX ADMIN — PRENATAL WITH FERROUS FUM AND FOLIC ACID 1 TABLET: 3080; 920; 120; 400; 22; 1.84; 3; 20; 10; 1; 12; 200; 27; 25; 2 TABLET ORAL at 09:38

## 2023-10-16 ASSESSMENT — EDINBURGH POSTNATAL DEPRESSION SCALE (EPDS)
I HAVE BEEN ANXIOUS OR WORRIED FOR NO GOOD REASON: NO, NOT AT ALL
I HAVE BEEN ABLE TO LAUGH AND SEE THE FUNNY SIDE OF THINGS: AS MUCH AS I ALWAYS COULD
I HAVE LOOKED FORWARD WITH ENJOYMENT TO THINGS: AS MUCH AS I EVER DID
I HAVE BLAMED MYSELF UNNECESSARILY WHEN THINGS WENT WRONG: NO, NEVER
THINGS HAVE BEEN GETTING ON TOP OF ME: NO, I HAVE BEEN COPING AS WELL AS EVER
THE THOUGHT OF HARMING MYSELF HAS OCCURRED TO ME: NEVER
I HAVE FELT SAD OR MISERABLE: NO, NOT AT ALL
I HAVE FELT SCARED OR PANICKY FOR NO GOOD REASON: NO, NOT AT ALL
I HAVE BEEN SO UNHAPPY THAT I HAVE BEEN CRYING: NO, NEVER
I HAVE BEEN SO UNHAPPY THAT I HAVE HAD DIFFICULTY SLEEPING: NOT AT ALL

## 2023-10-16 ASSESSMENT — PAIN DESCRIPTION - PAIN TYPE: TYPE: ACUTE PAIN

## 2023-10-16 ASSESSMENT — PAIN SCALES - GENERAL: PAIN_LEVEL: 1

## 2023-10-16 NOTE — DISCHARGE SUMMARY
Discharge Summary:      Cristin Desai    Admit Date:   10/14/2023  Discharge Date:  10/16/2023     Admitting diagnosis:  Encounter for planned induction of labor [Z34.90]  Discharge Diagnosis: Status post vaginal, spontaneous.  Pregnancy Complications: none  Tubal Ligation:  no        History:  Past Medical History:   Diagnosis Date    Headache(784.0)      OB History    Para Term  AB Living   2 2 2     2   SAB IAB Ectopic Molar Multiple Live Births           0 2      # Outcome Date GA Lbr Jose Luis/2nd Weight Sex Delivery Anes PTL Lv   2 Term 10/15/23 39w1d / 00:20 3.73 kg (8 lb 3.6 oz) F Vag-Spont EPI N TERRELL   1 Term 03/10/18 40w3d  3.715 kg (8 lb 3 oz) F Vag-Spont EPI N TERRELL      Birth Comments: Pt states no complications        Patient has no known allergies.  Patient Active Problem List    Diagnosis Date Noted    Encounter for planned induction of labor 10/14/2023    Supervision of normal pregnancy 2023    Excessive fetal growth affecting management of mother in second trimester, antepartum 2023    Marijuana use during pregnancy 2023    Prenatal care, subsequent pregnancy, second trimester 2023    Rh negative state in antepartum period 2017        Hospital Course:   28 y.o. , now para 2, was admitted with the above mentioned diagnosis, underwent Active Labor, vaginal, spontaneous. Patient postpartum course was unremarkable, with progressive advancement in diet , ambulation and toleration of oral analgesia. Patient without complaints today and desires discharge.      Vitals:    10/15/23 1033 10/15/23 1431 10/15/23 1732 10/16/23 0600   BP: 121/76 132/81 129/88 138/85   Pulse: 82 76 69 72   Resp: 18 16 16 18   Temp: 37.2 °C (99 °F) 36.9 °C (98.5 °F) 36.6 °C (97.8 °F) 36.8 °C (98.3 °F)   TempSrc: Temporal Temporal Temporal Temporal   SpO2: 98% 98% 97% 97%   Weight:       Height:           Current Facility-Administered Medications   Medication Dose    oxytocin  (Pitocin) 0.02 Units/mL LR (induction of labor)  0.5-20 hermann-units/min    ropivacaine 0.2 % (Naropin) injection      lactated ringers infusion      docusate sodium (Colace) capsule 100 mg  100 mg    ibuprofen (Motrin) tablet 800 mg  800 mg    acetaminophen (Tylenol) tablet 1,000 mg  1,000 mg    prenatal plus vitamin (Stuartnatal 1+1) 27-1 MG tablet 1 Tablet  1 Tablet    LR infusion      oxytocin (Pitocin) infusion (for post delivery)  125 mL/hr       Exam:  Breast Exam: negative  Abdomen: Abdomen soft, non-tender. BS normal. No masses,  No organomegaly  Fundus Non Tender: yes  Incision: none  Perineum: 1st degree tear-repaired  Extremity: extremities, peripheral pulses and reflexes normal     Labs:  Recent Labs     10/14/23  1148 10/16/23  0443   WBC 12.4* 14.6*   RBC 3.81* 3.79*   HEMOGLOBIN 9.7* 9.7*   HEMATOCRIT 31.5* 31.2*   MCV 82.7 82.3   MCH 25.5* 25.6*   MCHC 30.8* 31.1*   RDW 46.1 47.1   PLATELETCT 267 223   MPV 11.8 12.1        Activity:   Discharge to home  Pelvic Rest x 6 weeks    Assessment:  normal postpartum course  Discharge Assessment: Taking in adequate diet and fluids, no heavy bleeding or foul smelling discharge, no redness or severe pain of breasts, voiding without difficulty      Follow up: .[facility] in 5 weeks for vaginal ; check.     Pt need to call or go to ED for any of the following:  Fever over 100.5  Severe abd pain  Severe pain or swelling or drainage of laceration or incision site  Red streaks or painful masses in the breasts  Foul smelling d/c or lochia  Heavy vaginal bleeding saturating a pad per hour  Sxs of PP depression  Severe headache  Visual changes      Discharge Meds:   Current Outpatient Medications   Medication Sig Dispense Refill    acetaminophen (TYLENOL) 500 MG Tab Take 2 Tablets by mouth every 6 hours as needed for Moderate Pain. 30 Tablet 0    docusate sodium 100 MG Cap Take 100 mg by mouth 2 times a day as needed for Constipation. 60 Capsule 0     ibuprofen (MOTRIN) 800 MG Tab Take 1 Tablet by mouth every 8 hours as needed for Moderate Pain. 30 Tablet 0    ferrous sulfate 325 (65 Fe) MG tablet Take 1 Tablet by mouth every day. 30 Tablet 4       DREAD Mendez.

## 2023-10-16 NOTE — CARE PLAN
The patient is Stable - Low risk of patient condition declining or worsening    Shift Goals  Clinical Goals: VSS,light lochia  Patient Goals: breast feed,rest  Family Goals: rest,breast feed    Progress made toward(s) clinical / shift goals:  progressing    Patient is not progressing towards the following goals:

## 2023-10-16 NOTE — PROGRESS NOTES
0930 Assessment completed. Lochia light, fundus firm. Plan of care reviewed. Reports mild pain, see Mar for intervention, will call if further pain intervention needed.

## 2023-10-16 NOTE — ANESTHESIA POSTPROCEDURE EVALUATION
Patient: Cristin Desai    Procedure Summary     Date: 10/15/23 Room / Location:     Anesthesia Start: 0219 Anesthesia Stop: 0650    Procedure: Labor Epidural Diagnosis:     Scheduled Providers:  Responsible Provider: Vasiliy Yip M.D.    Anesthesia Type: epidural ASA Status: 2          Final Anesthesia Type: epidural  Last vitals  BP   Blood Pressure: 129/88    Temp   36.6 °C (97.8 °F)    Pulse   69   Resp   16    SpO2   97 %      Anesthesia Post Evaluation    Patient location during evaluation: floor  Patient participation: complete - patient participated  Level of consciousness: awake  Pain score: 1    Airway patency: patent  Anesthetic complications: no  Cardiovascular status: hemodynamically stable  Respiratory status: acceptable  Hydration status: acceptable    PONV: none          No notable events documented.     Nurse Pain Score: 0 (NPRS)

## 2023-10-16 NOTE — LACTATION NOTE
This note was copied from a baby's chart.  Met with mother for a follow up lactation consultation. Mom reports that baby cluster fed overnight. She is feeling a little tender after cluster feeding, but she is not experiencing pain with latch. Baby is currently latched in cross cradle position on the left side. When baby un latched, LC assisted with positioning on the right side, and following a few attempts baby latched deeply.     Reviewed signs of milk transfer. Look for frequent voids/stools, and by day 5 baby's stool should transition to yellow/loose/seedy.    Plan: Continue cue based feedings, at least 8 times every 24hrs. Offer both breasts at each feeding. Frequent skin to skin. Do not limit baby's time at the breast. Reach out to RN/LC for assistance while inpatient. [facility] outpatient lactation consultant handout provided. MOB enrolled with [facility], will follow up with [facility] LC as needed.

## 2023-10-16 NOTE — CARE PLAN
The patient is Stable - Low risk of patient condition declining or worsening    Shift Goals  Clinical Goals: Patient will maintain stable VS; Lochia WDL; Pain WDL;  Patient Goals: breast feed,rest  Family Goals: rest,breast feed    Progress made toward(s) clinical / shift goals:    Problem: Knowledge Deficit - L&D  Goal: Patient and family/caregivers will demonstrate understanding of plan of care, disease process/condition, diagnostic tests and medications  Outcome: Met     Problem: Risk for Excess Fluid Volume  Goal: Patient will demonstrate pulse, blood pressure and neurologic signs within expected ranges and without any respiratory complications  Outcome: Met     Problem: Psychosocial - L&D  Goal: Patient's level of anxiety will decrease  Outcome: Met  Goal: Patient will be able to discuss coping skills during hospitalization  Outcome: Met  Goal: Patient's ability to re-evaluate and adapt role responsibilities will improve  Outcome: Met  Goal: Spiritual and cultural needs incorporated into hospitalization  Outcome: Met     Problem: Risk for Venous Thromboembolism (VTE)  Goal: VTE prevention measures will be implemented and patient will remain free from VTE  Outcome: Met     Problem: Risk for Infection and Impaired Wound Healing  Goal: Patient will remain free from infection  Outcome: Met  Goal: Patient's wound/surgical incision will decrease in size and heals properly  Outcome: Met     Problem: Risk for Fluid Imbalance  Goal: Patient's fluid volume balance will be maintained or improve  Outcome: Met     Problem: Risk for Injury  Goal:  Patient and fetus will be free of preventable injury/complications  Outcome: Met     Problem: Pain  Goal: Patient's pain will be alleviated or reduced to the patient’s comfort goal  Outcome: Met     Problem: Discharge Barriers/Planning  Goal: Patient's continuum of care needs are met  Outcome: Met     Problem: Knowledge Deficit - Standard  Goal: Patient and family/care givers will demonstrate understanding of plan of care, disease process/condition, diagnostic tests and medications  Outcome: Met     Problem: Knowledge Deficit - Postpartum  Goal: Patient will verbalize and demonstrate understanding of self and infant care  Outcome: Met     Problem: Psychosocial - Postpartum  Goal: Patient will verbalize and demonstrate effective bonding and parenting behavior  Outcome: Met     Problem: Altered Physiologic Condition  Goal: Patient physiologically stable as evidenced by normal lochia, palpable uterine involution and vitals within normal limits  Outcome: Met     Problem: Infection - Postpartum  Goal: Postpartum patient will be free of signs and symptoms of infection  Outcome: Met     Problem: Respiratory/Oxygenation Function Post-Surgical  Goal: Patient will achieve/maintain normal respiratory rate/effort  Outcome: Met     Problem: Bowel Elimination - Post Surgical  Goal: Patient will resume regular bowel sounds and function with no discomfort or distention  Outcome: Met     Problem: Early Mobilization - Post Surgery  Goal: Early mobilization post surgery  Outcome: Met     Problem: Pain - Standard  Goal: Alleviation of pain or a reduction in pain to the patient’s comfort goal  Outcome: Met

## 2023-10-16 NOTE — ANESTHESIA TIME REPORT
Anesthesia Start and Stop Event Times     Date Time Event    10/15/2023 0213 Ready for Procedure     0219 Anesthesia Start     0650 Anesthesia Stop        Responsible Staff  10/15/23    Name Role Begin End    Huan Elizondo M.D. Anesth 0219 0652    Vasiliy Yip M.D. Anesth 0652         Overtime Reason:  no overtime (within assigned shift)    Comments:

## 2023-10-16 NOTE — DISCHARGE INSTRUCTIONS
PATIENT DISCHARGE EDUCATION INSTRUCTION SHEET    REASONS TO CALL YOUR OBSTETRICIAN  Persistent fever, shaking, chills (Temperature higher than 100.4) may indicate you have an infection  Heavy bleeding: soaking more than 1 pad per hour; Passing clots an egg-sized clot or bigger may mean you have an postpartum hemorrhage  Foul odor from vagina or bad smelling or discolored discharge or blood  Breast infection (Mastitis symptoms); breast pain, chills, fever, redness or red streaks, may feel flu like symptoms  Urinary pain, burning or frequency  Redness, swelling, warmth, or painful to touch in the calf area of your leg may mean you have a blood clot  Severe or intensified depression, thoughts or feelings of wanting to hurt yourself or someone else   Pain in chest, obstructed breathing or shortness of breath (trouble catching your breath) may mean you are having a postpartum complication. Call your provider immediately   Headache that does not get better, even after taking medicine, a bad headache with vision changes or pain in the upper right area of your belly may mean you have high blood pressure or post birth preeclampsia. Call your provider immediately    HAND WASHING  All family and friends should wash their hands:  Before and after holding the baby  Before feeding the baby  After using the restroom or changing the baby's diaper    WOUND CARE  Ask your physician for additional care instructions. In general:  Episiotomy/Laceration  May use fran-spray bottle, witch hazel pads and dermaplast spray for comfort  Use fran-spray bottle after urinating to cleanse perineal area  To prevent burning during urination spray fran-water bottle on labial area   Pat perineal area dry until episiotomy/laceration is healed  Continue to use fran-bottle until bleeding stops as needed  If have a 2nd degree laceration or greater, a Sitz bath can offer relief from soreness, burning, and inflammation   Sitz Bath   Sit in 6 inches of warm  "water and soak laceration as needed until the laceration heals    VAGINAL CARE AND BLEEDING  Nothing inside vagina for 6 weeks:   No sexual intercourse, tampons or douching  Bleeding may continue for 2-4 weeks. Amount and color may vary  Soaking 1 pad or more in an hour for several hours is considered heavy bleeding  Passing large egg sized blood clots can be concerning  If you feel like you have heavy bleeding or are having increasing amount of blood clots call your Obstetrician immediately  If you begin feeling faint upon standing, feeling sick to your stomach, have clammy skin, a really fast heartbeat, have chills, start feeling confused, dizzy, sleepy or weak, or feeling like you're going to faint call your Obstetrician immediately    HYPERTENSION   Preeclampsia or gestational hypertension are types of high blood pressure that only pregnant women can get. It is important for you to be aware of symptoms to seek early intervention and treatment. If you have any of these symptoms immediately call your Obstetrician    Vision changes or blurred vision   Severe headache or pain that is unrelieved with medication and will not go away  Persistent pain in upper abdomen or shoulder   Increased swelling of face, feet, or hands  Difficulty breathing or shortness of breath at rest  Urinating less than usual    URINATION AND BOWEL MOVEMENTS  Eating more fiber (bran cereal, fruits, and vegetables) and drinking plenty of fluids will help to avoid constipation  Urinary frequency and urgency after childbirth is normal  If you experience any urinary pain, burning or frequency call your provider    BIRTH CONTROL  It is possible to become pregnant at any time after delivery and while breastfeeding  Plan to discuss a method of birth control with your physician at your post delivery follow up visit    POSTPARTUM BLUES  During the first few days after birth, you may experience a sense of the \"blues\" which may include impatience, " "irritability or even crying. These feelings come and go quickly. However, as many as 1 in 10 women experience emotional symptoms known as postpartum depression.     POSTPARTUM DEPRESSION    May start as early as the second or third day after delivery or take several weeks or months to develop. Symptoms of \"blues\" are present, but are more intense: Crying spells; loss of appetite; feelings of hopelessness or loss of control; fear of touching the baby; over concern or no concern at all about the baby; little or no concern about your own appearance/caring for yourself; and/or inability to sleep or excessive sleeping. Contact your Obstetrician if you are experiencing any of these symptoms     PREVENTING SHAKEN BABY  If you are angry or stressed, PUT THE BABY IN THE CRIB, step away, take some deep breaths, and wait until you are calm to care for the baby. DO NOT SHAKE THE BABY. You are not alone, call a supporter for help.  [facility] 24/7 crisis call line (626-430-9934) or (1-171.494.4676)  You can also text them, text \"ANSWER\" (140292)  "

## 2023-10-16 NOTE — DISCHARGE PLANNING
Discharge Planning Assessment Post Partum    Reason for Referral: History of THC   Address:  [place] [address]  Phone: [phone]  Type of Living Situation: living with FOB and daughter  Mom Diagnosis: Pregnancy, vaginal delivery  Baby Diagnosis: -39.1 weeks  Primary Language: English    Name of Baby: Cecil Desai (: 10/15/23)  Father of the Baby: Leonard Desai   Involved in baby’s care? Yes  Contact Information: [phone]    [facility]  Mom's PCP:  No PCP listed  PCP for new baby: NASIMA Pinto    Support System: FOB  Coping/Bonding between mother & baby: Yes  Source of Feeding: breast feeding  Supplies for Infant: prepared for infant; denies any needs    Mom's Insurance: [employer] Medicaid   Baby Covered on Insurance:Yes  Mother Employed/School: Not currently  Other children in the home/names & ages: daughter-age 5 years     Financial Hardship/Income: No   Mom's Mental status: alert and oriented  Services used prior to admit: Medicaid, WIC, and food stamps    CPS History: No  Psychiatric History: No.  MOB scored a 0 on the EPDS screen  Domestic Violence History: No  Drug/ETOH History: history of THC-infant's UDS is negative     Resources Provided: Offered resources, however parents state they are well-prepared for infant and deny any needs  Referrals Made: None     Clearance for Discharge: Infant is cleared to discharge home with parents once medically cleared

## 2023-11-21 ENCOUNTER — POST PARTUM (OUTPATIENT)
Dept: OBGYN | Facility: CLINIC | Age: 28
End: 2023-11-21
Payer: MEDICAID

## 2023-11-21 VITALS — DIASTOLIC BLOOD PRESSURE: 64 MMHG | WEIGHT: 174 LBS | BODY MASS INDEX: 28.08 KG/M2 | SYSTOLIC BLOOD PRESSURE: 104 MMHG

## 2023-11-21 PROBLEM — Z34.90 ENCOUNTER FOR PLANNED INDUCTION OF LABOR: Status: RESOLVED | Noted: 2023-10-14 | Resolved: 2023-11-21

## 2023-11-21 PROBLEM — O36.62X0 EXCESSIVE FETAL GROWTH AFFECTING MANAGEMENT OF MOTHER IN SECOND TRIMESTER, ANTEPARTUM: Status: RESOLVED | Noted: 2023-06-06 | Resolved: 2023-11-21

## 2023-11-21 PROCEDURE — 3078F DIAST BP <80 MM HG: CPT | Performed by: ADVANCED PRACTICE MIDWIFE

## 2023-11-21 PROCEDURE — 0503F POSTPARTUM CARE VISIT: CPT | Performed by: ADVANCED PRACTICE MIDWIFE

## 2023-11-21 PROCEDURE — 3074F SYST BP LT 130 MM HG: CPT | Performed by: ADVANCED PRACTICE MIDWIFE

## 2023-11-21 NOTE — PROGRESS NOTES
Subjective   Subjective:    Cristin Desai is a 28 y.o. female who presents for her postpartum exam 5 weeks following . Her prenatal course was uncomplicated.  Her delivery was uncomplicated. Her method of feeding infant is bottlefeeding formula. She desires condoms for her birth control method. Reports sex prior to this appointment. Patient denies crying spells, irritability, or mood swings.     Pap WNL on 3/2023    Eating a regular diet without difficulty.   Bowel movement are Normal.      Breast problems no  Dysuria no  Vaginal bleeding no  Vaginal itching no    Formula feeding. Did have engorgement and tried to pump without success. Fussier with breast milk. Stopped efforts at 1 week. No pain or swelling.       Problem List     Patient Active Problem List    Diagnosis Date Noted    Encounter for planned induction of labor 10/14/2023    Supervision of normal pregnancy 2023    Excessive fetal growth affecting management of mother in second trimester, antepartum 2023    Marijuana use during pregnancy 2023    Prenatal care, subsequent pregnancy, second trimester 2023    Rh negative state in antepartum period 2017       Objective    EDPS 3      Lab:  Recent Results (from the past 1008 hour(s))   Hold Blood Bank Specimen (Not Tested)    Collection Time: 10/14/23 11:48 AM   Result Value Ref Range    Holding Tube - Bb DONE    CBC with differential    Collection Time: 10/14/23 11:48 AM   Result Value Ref Range    WBC 12.4 (H) 4.8 - 10.8 K/uL    RBC 3.81 (L) 4.20 - 5.40 M/uL    Hemoglobin 9.7 (L) 12.0 - 16.0 g/dL    Hematocrit 31.5 (L) 37.0 - 47.0 %    MCV 82.7 81.4 - 97.8 fL    MCH 25.5 (L) 27.0 - 33.0 pg    MCHC 30.8 (L) 32.2 - 35.5 g/dL    RDW 46.1 35.9 - 50.0 fL    Platelet Count 267 164 - 446 K/uL    MPV 11.8 9.0 - 12.9 fL    Neutrophils-Polys 72.90 (H) 44.00 - 72.00 %    Lymphocytes 22.10 22.00 - 41.00 %    Monocytes 3.30 0.00 - 13.40 %    Eosinophils 0.50 0.00 - 6.90 %     Basophils 0.30 0.00 - 1.80 %    Immature Granulocytes 0.90 0.00 - 0.90 %    Nucleated RBC 0.00 0.00 - 0.20 /100 WBC    Neutrophils (Absolute) 9.07 (H) 1.82 - 7.42 K/uL    Lymphs (Absolute) 2.75 1.00 - 4.80 K/uL    Monos (Absolute) 0.41 0.00 - 0.85 K/uL    Eos (Absolute) 0.06 0.00 - 0.51 K/uL    Baso (Absolute) 0.04 0.00 - 0.12 K/uL    Immature Granulocytes (abs) 0.11 0.00 - 0.11 K/uL    NRBC (Absolute) 0.00 K/uL   T.PALLIDUM AB AVELINO (Syphilis)    Collection Time: 10/14/23 11:48 AM   Result Value Ref Range    Syphilis, Treponemal Qual Non-Reactive Non-Reactive   MOM RHHDN/RHOGAM    Collection Time: 10/14/23 11:48 AM   Result Value Ref Range    Number Of Rh Doses Indicated ZERO    CBC without differential- Once in AM regardless of delivery time    Collection Time: 10/16/23  4:43 AM   Result Value Ref Range    WBC 14.6 (H) 4.8 - 10.8 K/uL    RBC 3.79 (L) 4.20 - 5.40 M/uL    Hemoglobin 9.7 (L) 12.0 - 16.0 g/dL    Hematocrit 31.2 (L) 37.0 - 47.0 %    MCV 82.3 81.4 - 97.8 fL    MCH 25.6 (L) 27.0 - 33.0 pg    MCHC 31.1 (L) 32.2 - 35.5 g/dL    RDW 47.1 35.9 - 50.0 fL    Platelet Count 223 164 - 446 K/uL    MPV 12.1 9.0 - 12.9 fL     Vitals:    11/21/23 1427   BP: 104/64   Weight: 174 lb     Vitals:    11/21/23 1427   BP: 104/64     Objective    Well nourished female in no acute distress  A& O x 3  Respirations clear and non labored on room air  No edema noted and pulses WNL bilaterally in lower extremities; no claudication  BS x 4; no guarding or tenderness    Genitourinary: Pelvic exam was performed with patient supine. External genitalia with no abnormal pigmentation, labial fusion, rash, tenderness, lesion or injury to the labia bilaterally. Vagina is moist with no lesions, foul discharge, erythema, tenderness or bleeding. Mild discomfort with manipulation of introitus. Epis/laceration well healed.     Assessment   Assessment:    1. Postpartum Exam  2. General Counseling and Advice on Contraception  3. Screening for  Postpartum Depression      Plan   Plan:    1. Reviewed normal healing associated with lacerations.   2. Continue PNV   3. Contraceptive counseling- Discussed options and patient desires to use condoms at this time.   4. Discussed diet, exercise and resumption of sexual activity.  Discussed signs and symptoms of stress incontinence and reviewed pelvic floor exercises.    5. Follow up 1 year

## 2024-04-09 NOTE — PROGRESS NOTES
Rhogam Injection given today   NDC: 00598-632-09  LOT#: [id number]  Expiration Date: 10/20/2019    Dose: 1500 IU  Site: Left Upper Outer Quadrant Gluteal    Patient educated on use and side effects of medication. Name and  verified prior to injection. Pt tolerated? yes     Verified by ALLY    Administered by Katelin Campa at 9:07 AM.    Patient Provided Medication: no         IMPROVE-DD Application Not Available